# Patient Record
Sex: MALE | Race: WHITE | NOT HISPANIC OR LATINO | URBAN - METROPOLITAN AREA
[De-identification: names, ages, dates, MRNs, and addresses within clinical notes are randomized per-mention and may not be internally consistent; named-entity substitution may affect disease eponyms.]

---

## 2018-02-12 ENCOUNTER — INPATIENT (INPATIENT)
Facility: HOSPITAL | Age: 68
LOS: 3 days | Discharge: NURSING, REHAB OR LONG-TERM CARE | DRG: 470 | End: 2018-02-16
Attending: ORTHOPAEDIC SURGERY | Admitting: ORTHOPAEDIC SURGERY
Payer: MEDICARE

## 2018-02-12 VITALS
DIASTOLIC BLOOD PRESSURE: 68 MMHG | TEMPERATURE: 98 F | OXYGEN SATURATION: 98 % | HEART RATE: 60 BPM | RESPIRATION RATE: 20 BRPM | SYSTOLIC BLOOD PRESSURE: 134 MMHG

## 2018-02-12 LAB
ALBUMIN SERPL ELPH-MCNC: 4.3 G/DL — SIGNIFICANT CHANGE UP (ref 3.3–5)
ALP SERPL-CCNC: 75 U/L — SIGNIFICANT CHANGE UP (ref 40–120)
ALT FLD-CCNC: 16 U/L RC — SIGNIFICANT CHANGE UP (ref 10–45)
ANION GAP SERPL CALC-SCNC: 10 MMOL/L — SIGNIFICANT CHANGE UP (ref 5–17)
AST SERPL-CCNC: 24 U/L — SIGNIFICANT CHANGE UP (ref 10–40)
BASOPHILS # BLD AUTO: 0 K/UL — SIGNIFICANT CHANGE UP (ref 0–0.2)
BASOPHILS NFR BLD AUTO: 0.1 % — SIGNIFICANT CHANGE UP (ref 0–2)
BILIRUB SERPL-MCNC: 0.3 MG/DL — SIGNIFICANT CHANGE UP (ref 0.2–1.2)
BUN SERPL-MCNC: 13 MG/DL — SIGNIFICANT CHANGE UP (ref 7–23)
CALCIUM SERPL-MCNC: 9.2 MG/DL — SIGNIFICANT CHANGE UP (ref 8.4–10.5)
CHLORIDE SERPL-SCNC: 97 MMOL/L — SIGNIFICANT CHANGE UP (ref 96–108)
CK MB CFR SERPL CALC: 2.1 NG/ML — SIGNIFICANT CHANGE UP (ref 0–6.7)
CK SERPL-CCNC: 93 U/L — SIGNIFICANT CHANGE UP (ref 30–200)
CO2 SERPL-SCNC: 30 MMOL/L — SIGNIFICANT CHANGE UP (ref 22–31)
CREAT SERPL-MCNC: 0.84 MG/DL — SIGNIFICANT CHANGE UP (ref 0.5–1.3)
EOSINOPHIL # BLD AUTO: 0.1 K/UL — SIGNIFICANT CHANGE UP (ref 0–0.5)
EOSINOPHIL NFR BLD AUTO: 0.7 % — SIGNIFICANT CHANGE UP (ref 0–6)
GLUCOSE SERPL-MCNC: 143 MG/DL — HIGH (ref 70–99)
HCT VFR BLD CALC: 38.5 % — LOW (ref 39–50)
HGB BLD-MCNC: 13.4 G/DL — SIGNIFICANT CHANGE UP (ref 13–17)
LYMPHOCYTES # BLD AUTO: 1.3 K/UL — SIGNIFICANT CHANGE UP (ref 1–3.3)
LYMPHOCYTES # BLD AUTO: 14 % — SIGNIFICANT CHANGE UP (ref 13–44)
MAGNESIUM SERPL-MCNC: 1.9 MG/DL — SIGNIFICANT CHANGE UP (ref 1.6–2.6)
MCHC RBC-ENTMCNC: 31.8 PG — SIGNIFICANT CHANGE UP (ref 27–34)
MCHC RBC-ENTMCNC: 34.7 GM/DL — SIGNIFICANT CHANGE UP (ref 32–36)
MCV RBC AUTO: 91.7 FL — SIGNIFICANT CHANGE UP (ref 80–100)
MONOCYTES # BLD AUTO: 0.4 K/UL — SIGNIFICANT CHANGE UP (ref 0–0.9)
MONOCYTES NFR BLD AUTO: 4.5 % — SIGNIFICANT CHANGE UP (ref 2–14)
NEUTROPHILS # BLD AUTO: 7.3 K/UL — SIGNIFICANT CHANGE UP (ref 1.8–7.4)
NEUTROPHILS NFR BLD AUTO: 80.7 % — HIGH (ref 43–77)
PLATELET # BLD AUTO: 159 K/UL — SIGNIFICANT CHANGE UP (ref 150–400)
POTASSIUM SERPL-MCNC: 3.6 MMOL/L — SIGNIFICANT CHANGE UP (ref 3.5–5.3)
POTASSIUM SERPL-SCNC: 3.6 MMOL/L — SIGNIFICANT CHANGE UP (ref 3.5–5.3)
PROT SERPL-MCNC: 7.1 G/DL — SIGNIFICANT CHANGE UP (ref 6–8.3)
RBC # BLD: 4.2 M/UL — SIGNIFICANT CHANGE UP (ref 4.2–5.8)
RBC # FLD: 12 % — SIGNIFICANT CHANGE UP (ref 10.3–14.5)
SODIUM SERPL-SCNC: 137 MMOL/L — SIGNIFICANT CHANGE UP (ref 135–145)
TROPONIN T SERPL-MCNC: <0.01 NG/ML — SIGNIFICANT CHANGE UP (ref 0–0.06)
WBC # BLD: 9 K/UL — SIGNIFICANT CHANGE UP (ref 3.8–10.5)
WBC # FLD AUTO: 9 K/UL — SIGNIFICANT CHANGE UP (ref 3.8–10.5)

## 2018-02-12 PROCEDURE — 76377 3D RENDER W/INTRP POSTPROCES: CPT | Mod: 26

## 2018-02-12 PROCEDURE — 93010 ELECTROCARDIOGRAM REPORT: CPT

## 2018-02-12 PROCEDURE — 72192 CT PELVIS W/O DYE: CPT | Mod: 26

## 2018-02-12 PROCEDURE — 99285 EMERGENCY DEPT VISIT HI MDM: CPT | Mod: 25,GC

## 2018-02-12 RX ORDER — DIAZEPAM 5 MG
5 TABLET ORAL ONCE
Qty: 0 | Refills: 0 | Status: DISCONTINUED | OUTPATIENT
Start: 2018-02-12 | End: 2018-02-12

## 2018-02-12 RX ORDER — MORPHINE SULFATE 50 MG/1
2 CAPSULE, EXTENDED RELEASE ORAL ONCE
Qty: 0 | Refills: 0 | Status: DISCONTINUED | OUTPATIENT
Start: 2018-02-12 | End: 2018-02-12

## 2018-02-12 RX ADMIN — MORPHINE SULFATE 2 MILLIGRAM(S): 50 CAPSULE, EXTENDED RELEASE ORAL at 23:05

## 2018-02-12 RX ADMIN — MORPHINE SULFATE 2 MILLIGRAM(S): 50 CAPSULE, EXTENDED RELEASE ORAL at 22:42

## 2018-02-12 RX ADMIN — Medication 5 MILLIGRAM(S): at 22:44

## 2018-02-12 NOTE — ED ADULT NURSE NOTE - PMH
CVA (cerebral vascular accident)    Weakness due to acute cerebrovascular accident (CVA)  right side

## 2018-02-12 NOTE — ED PROVIDER NOTE - ATTENDING CONTRIBUTION TO CARE
attending Anderson: 67yM former smoker prior CVA with residual RUE paresis, uses quad cane, HTN, who presents s/p fall with right leg pain. Reports episode of near syncope with fall but no LOC after feeling lightheaded. Denies chest pain, palpitation visual changes. Pt tried to sit at the edge of the bed but missed and fell to the ground on his buttocks. Now with sharp R groin pain radiating down the right leg, not able to fully extend. Concern for R hip fx. Unable to ambulate or bear weight. Will obtain ekg, place on tele, labs including cardiac enzymes, xray pelvis/hip and CT pelvis, pain control, likely ortho consult and admit.

## 2018-02-12 NOTE — ED ADULT NURSE NOTE - NURSING MUSC EXTREMITY NORMAL ROM
right upper extremity/left upper extremity/left lower extremity left upper extremity/left lower extremity

## 2018-02-12 NOTE — ED PROVIDER NOTE - OBJECTIVE STATEMENT
The patient is a 67 Y.O. male with pmh of CVA, right upper paresis, uses quad cane, HTN, who presents s/p fall with new right leg pain.     The patient came home from a walk, felt light headed, without LOC, without chest pain, palpitation visual changes tried to sit at the edge of the bed but missed and fell to the ground on his buttocks. After the fall the patient started to have pain, sharp radiating down the right leg, not able to fully extend. Has history as spasm in the past on baclofen at home. Unable to fully extend leg and severe pain with palpation of the hamstring and attempted extension of the leg. Son at bedside translating. [phone].

## 2018-02-12 NOTE — ED PROVIDER NOTE - LOWER EXTREMITY EXAM, RIGHT
decreased ROM, severe pain with attempted extension of the leg, tenderness to palpation on squeezing the hamstring, muscle tightness at the site. Ant drawer, lacmans, valgus, varus test negative, unable to jr 2/2 to pain tightness. No knee effusion/tenderness

## 2018-02-12 NOTE — ED PROVIDER NOTE - SHIFT CHANGE DETAILS
Attending MD Adkins: 67M with near syncope presents to the ED with R sided pain s/p fall, found to have R femoral neck fracture displace.  Pending admission ortho vs medicine.

## 2018-02-12 NOTE — ED ADULT NURSE NOTE - NURSING MUSC EXTREMITY LIMITED ROM
decreased ROM secondary to pain/right lower extremity right lower extremity/decreased ROM secondary to pain-unable to fully extend rt leg. rt arm partially contracted since stroke

## 2018-02-12 NOTE — ED ADULT NURSE NOTE - OBJECTIVE STATEMENT
pt c/o rt leg pain s/p fall onto buttocks when trying to sit in chair. did no hit head, no loc." pt with decreased ROM to RLE secondary to pain pt c/o rt leg pain s/p fall onto buttocks when trying to sit in chair. did no hit head, no loc." pt with decreased ROM to RLE secondary to pain. pt unable to fully extend rt leg

## 2018-02-12 NOTE — ED PROVIDER NOTE - CARE PLAN
Principal Discharge DX:	Near syncope  Secondary Diagnosis:	Leg pain, diffuse, right  Secondary Diagnosis:	Fall

## 2018-02-12 NOTE — ED PROVIDER NOTE - NEURO NEGATIVE STATEMENT, MLM
no loss of consciousness, no gait abnormality, no headache, no sensory deficits, and no weakness. + light headedness

## 2018-02-12 NOTE — ED PROVIDER NOTE - MEDICAL DECISION MAKING DETAILS
The patient is a 67 Y.O. male with pmh of CVA, right upper paresis, uses quad cane, HTN, who presents s/p fall with new right leg pain.  Pre-syncopal episode, likely spasms, r/o fracture, CT pelvic, xray of knee and pelvis,   Labs, EKG, CE x1, tele  Likely admit as patient not ambulating.

## 2018-02-12 NOTE — ED ADULT NURSE NOTE - CHPI ED SYMPTOMS NEG
no abrasion/no bleeding/no vomiting/no numbness/no loss of consciousness/no tingling/no weakness/no confusion/no deformity/no fever

## 2018-02-13 ENCOUNTER — TRANSCRIPTION ENCOUNTER (OUTPATIENT)
Age: 68
End: 2018-02-13

## 2018-02-13 DIAGNOSIS — R55 SYNCOPE AND COLLAPSE: ICD-10-CM

## 2018-02-13 LAB
ANION GAP SERPL CALC-SCNC: 11 MMOL/L — SIGNIFICANT CHANGE UP (ref 5–17)
APPEARANCE UR: CLEAR — SIGNIFICANT CHANGE UP
APTT BLD: 25.4 SEC — LOW (ref 27.5–37.4)
APTT BLD: 27.1 SEC — LOW (ref 27.5–37.4)
BILIRUB UR-MCNC: NEGATIVE — SIGNIFICANT CHANGE UP
BLD GP AB SCN SERPL QL: NEGATIVE — SIGNIFICANT CHANGE UP
BUN SERPL-MCNC: 11 MG/DL — SIGNIFICANT CHANGE UP (ref 7–23)
CALCIUM SERPL-MCNC: 8.9 MG/DL — SIGNIFICANT CHANGE UP (ref 8.4–10.5)
CHLORIDE SERPL-SCNC: 98 MMOL/L — SIGNIFICANT CHANGE UP (ref 96–108)
CO2 SERPL-SCNC: 27 MMOL/L — SIGNIFICANT CHANGE UP (ref 22–31)
COLOR SPEC: YELLOW — SIGNIFICANT CHANGE UP
CREAT SERPL-MCNC: 0.81 MG/DL — SIGNIFICANT CHANGE UP (ref 0.5–1.3)
DIFF PNL FLD: ABNORMAL
GLUCOSE SERPL-MCNC: 140 MG/DL — HIGH (ref 70–99)
GLUCOSE UR QL: NEGATIVE — SIGNIFICANT CHANGE UP
HCT VFR BLD CALC: 37 % — LOW (ref 39–50)
HGB BLD-MCNC: 13.2 G/DL — SIGNIFICANT CHANGE UP (ref 13–17)
INR BLD: 1.05 RATIO — SIGNIFICANT CHANGE UP (ref 0.88–1.16)
INR BLD: 1.14 RATIO — SIGNIFICANT CHANGE UP (ref 0.88–1.16)
KETONES UR-MCNC: NEGATIVE — SIGNIFICANT CHANGE UP
LEUKOCYTE ESTERASE UR-ACNC: NEGATIVE — SIGNIFICANT CHANGE UP
MCHC RBC-ENTMCNC: 32.5 PG — SIGNIFICANT CHANGE UP (ref 27–34)
MCHC RBC-ENTMCNC: 35.6 GM/DL — SIGNIFICANT CHANGE UP (ref 32–36)
MCV RBC AUTO: 91.3 FL — SIGNIFICANT CHANGE UP (ref 80–100)
NITRITE UR-MCNC: NEGATIVE — SIGNIFICANT CHANGE UP
PH UR: 7 — SIGNIFICANT CHANGE UP (ref 5–8)
PLATELET # BLD AUTO: 151 K/UL — SIGNIFICANT CHANGE UP (ref 150–400)
POTASSIUM SERPL-MCNC: 4.1 MMOL/L — SIGNIFICANT CHANGE UP (ref 3.5–5.3)
POTASSIUM SERPL-SCNC: 4.1 MMOL/L — SIGNIFICANT CHANGE UP (ref 3.5–5.3)
PROT UR-MCNC: NEGATIVE — SIGNIFICANT CHANGE UP
PROTHROM AB SERPL-ACNC: 11.5 SEC — SIGNIFICANT CHANGE UP (ref 9.8–12.7)
PROTHROM AB SERPL-ACNC: 12.5 SEC — SIGNIFICANT CHANGE UP (ref 9.8–12.7)
RBC # BLD: 4.06 M/UL — LOW (ref 4.2–5.8)
RBC # FLD: 11.9 % — SIGNIFICANT CHANGE UP (ref 10.3–14.5)
RH IG SCN BLD-IMP: POSITIVE — SIGNIFICANT CHANGE UP
RH IG SCN BLD-IMP: POSITIVE — SIGNIFICANT CHANGE UP
SODIUM SERPL-SCNC: 136 MMOL/L — SIGNIFICANT CHANGE UP (ref 135–145)
SP GR SPEC: 1.01 — SIGNIFICANT CHANGE UP (ref 1.01–1.02)
UROBILINOGEN FLD QL: NEGATIVE — SIGNIFICANT CHANGE UP
WBC # BLD: 10.6 K/UL — HIGH (ref 3.8–10.5)
WBC # FLD AUTO: 10.6 K/UL — HIGH (ref 3.8–10.5)

## 2018-02-13 PROCEDURE — 73502 X-RAY EXAM HIP UNI 2-3 VIEWS: CPT | Mod: 26,RT

## 2018-02-13 PROCEDURE — 73552 X-RAY EXAM OF FEMUR 2/>: CPT | Mod: 26,RT

## 2018-02-13 PROCEDURE — 71045 X-RAY EXAM CHEST 1 VIEW: CPT | Mod: 26

## 2018-02-13 PROCEDURE — 72170 X-RAY EXAM OF PELVIS: CPT | Mod: 26,59

## 2018-02-13 RX ORDER — CITALOPRAM 10 MG/1
20 TABLET, FILM COATED ORAL DAILY
Qty: 0 | Refills: 0 | Status: DISCONTINUED | OUTPATIENT
Start: 2018-02-13 | End: 2018-02-14

## 2018-02-13 RX ORDER — ACETAMINOPHEN 500 MG
650 TABLET ORAL EVERY 6 HOURS
Qty: 0 | Refills: 0 | Status: DISCONTINUED | OUTPATIENT
Start: 2018-02-13 | End: 2018-02-14

## 2018-02-13 RX ORDER — OXYCODONE HYDROCHLORIDE 5 MG/1
5 TABLET ORAL EVERY 4 HOURS
Qty: 0 | Refills: 0 | Status: DISCONTINUED | OUTPATIENT
Start: 2018-02-13 | End: 2018-02-14

## 2018-02-13 RX ORDER — TRAZODONE HCL 50 MG
50 TABLET ORAL AT BEDTIME
Qty: 0 | Refills: 0 | Status: DISCONTINUED | OUTPATIENT
Start: 2018-02-13 | End: 2018-02-14

## 2018-02-13 RX ORDER — ACETAMINOPHEN 500 MG
650 TABLET ORAL EVERY 6 HOURS
Qty: 0 | Refills: 0 | Status: DISCONTINUED | OUTPATIENT
Start: 2018-02-14 | End: 2018-02-16

## 2018-02-13 RX ORDER — POLYETHYLENE GLYCOL 3350 17 G/17G
17 POWDER, FOR SOLUTION ORAL DAILY
Qty: 0 | Refills: 0 | Status: DISCONTINUED | OUTPATIENT
Start: 2018-02-14 | End: 2018-02-16

## 2018-02-13 RX ORDER — MAGNESIUM HYDROXIDE 400 MG/1
30 TABLET, CHEWABLE ORAL DAILY
Qty: 0 | Refills: 0 | Status: DISCONTINUED | OUTPATIENT
Start: 2018-02-13 | End: 2018-02-14

## 2018-02-13 RX ORDER — ONDANSETRON 8 MG/1
4 TABLET, FILM COATED ORAL EVERY 6 HOURS
Qty: 0 | Refills: 0 | Status: DISCONTINUED | OUTPATIENT
Start: 2018-02-14 | End: 2018-02-16

## 2018-02-13 RX ORDER — CEFAZOLIN SODIUM 1 G
2000 VIAL (EA) INJECTION EVERY 8 HOURS
Qty: 0 | Refills: 0 | Status: COMPLETED | OUTPATIENT
Start: 2018-02-14 | End: 2018-02-14

## 2018-02-13 RX ORDER — PANTOPRAZOLE SODIUM 20 MG/1
40 TABLET, DELAYED RELEASE ORAL DAILY
Qty: 0 | Refills: 0 | Status: DISCONTINUED | OUTPATIENT
Start: 2018-02-14 | End: 2018-02-16

## 2018-02-13 RX ORDER — ASPIRIN/CALCIUM CARB/MAGNESIUM 324 MG
325 TABLET ORAL
Qty: 0 | Refills: 0 | Status: DISCONTINUED | OUTPATIENT
Start: 2018-02-14 | End: 2018-02-16

## 2018-02-13 RX ORDER — OXYCODONE HYDROCHLORIDE 5 MG/1
5 TABLET ORAL EVERY 4 HOURS
Qty: 0 | Refills: 0 | Status: DISCONTINUED | OUTPATIENT
Start: 2018-02-14 | End: 2018-02-16

## 2018-02-13 RX ORDER — SODIUM CHLORIDE 9 MG/ML
1000 INJECTION, SOLUTION INTRAVENOUS
Qty: 0 | Refills: 0 | Status: DISCONTINUED | OUTPATIENT
Start: 2018-02-13 | End: 2018-02-14

## 2018-02-13 RX ORDER — ASCORBIC ACID 60 MG
500 TABLET,CHEWABLE ORAL
Qty: 0 | Refills: 0 | Status: DISCONTINUED | OUTPATIENT
Start: 2018-02-13 | End: 2018-02-14

## 2018-02-13 RX ORDER — ONDANSETRON 8 MG/1
4 TABLET, FILM COATED ORAL EVERY 4 HOURS
Qty: 0 | Refills: 0 | Status: DISCONTINUED | OUTPATIENT
Start: 2018-02-13 | End: 2018-02-14

## 2018-02-13 RX ORDER — FERROUS SULFATE 325(65) MG
325 TABLET ORAL
Qty: 0 | Refills: 0 | Status: DISCONTINUED | OUTPATIENT
Start: 2018-02-13 | End: 2018-02-14

## 2018-02-13 RX ORDER — MAGNESIUM HYDROXIDE 400 MG/1
30 TABLET, CHEWABLE ORAL DAILY
Qty: 0 | Refills: 0 | Status: DISCONTINUED | OUTPATIENT
Start: 2018-02-14 | End: 2018-02-16

## 2018-02-13 RX ORDER — ASPIRIN/CALCIUM CARB/MAGNESIUM 324 MG
81 TABLET ORAL DAILY
Qty: 0 | Refills: 0 | Status: DISCONTINUED | OUTPATIENT
Start: 2018-02-13 | End: 2018-02-14

## 2018-02-13 RX ORDER — FOLIC ACID 0.8 MG
1 TABLET ORAL DAILY
Qty: 0 | Refills: 0 | Status: DISCONTINUED | OUTPATIENT
Start: 2018-02-13 | End: 2018-02-14

## 2018-02-13 RX ORDER — DOCUSATE SODIUM 100 MG
100 CAPSULE ORAL THREE TIMES A DAY
Qty: 0 | Refills: 0 | Status: DISCONTINUED | OUTPATIENT
Start: 2018-02-13 | End: 2018-02-14

## 2018-02-13 RX ORDER — SODIUM CHLORIDE 9 MG/ML
1000 INJECTION INTRAMUSCULAR; INTRAVENOUS; SUBCUTANEOUS ONCE
Qty: 0 | Refills: 0 | Status: COMPLETED | OUTPATIENT
Start: 2018-02-14 | End: 2018-02-14

## 2018-02-13 RX ORDER — DOCUSATE SODIUM 100 MG
100 CAPSULE ORAL THREE TIMES A DAY
Qty: 0 | Refills: 0 | Status: DISCONTINUED | OUTPATIENT
Start: 2018-02-14 | End: 2018-02-16

## 2018-02-13 RX ORDER — OXYCODONE HYDROCHLORIDE 5 MG/1
10 TABLET ORAL EVERY 4 HOURS
Qty: 0 | Refills: 0 | Status: DISCONTINUED | OUTPATIENT
Start: 2018-02-14 | End: 2018-02-16

## 2018-02-13 RX ORDER — MORPHINE SULFATE 50 MG/1
2 CAPSULE, EXTENDED RELEASE ORAL
Qty: 0 | Refills: 0 | Status: DISCONTINUED | OUTPATIENT
Start: 2018-02-14 | End: 2018-02-16

## 2018-02-13 RX ORDER — SENNA PLUS 8.6 MG/1
2 TABLET ORAL AT BEDTIME
Qty: 0 | Refills: 0 | Status: DISCONTINUED | OUTPATIENT
Start: 2018-02-14 | End: 2018-02-16

## 2018-02-13 RX ORDER — SODIUM CHLORIDE 9 MG/ML
1000 INJECTION, SOLUTION INTRAVENOUS
Qty: 0 | Refills: 0 | Status: DISCONTINUED | OUTPATIENT
Start: 2018-02-14 | End: 2018-02-16

## 2018-02-13 RX ORDER — DIPHENHYDRAMINE HCL 50 MG
25 CAPSULE ORAL AT BEDTIME
Qty: 0 | Refills: 0 | Status: DISCONTINUED | OUTPATIENT
Start: 2018-02-13 | End: 2018-02-14

## 2018-02-13 RX ORDER — POLYETHYLENE GLYCOL 3350 17 G/17G
17 POWDER, FOR SOLUTION ORAL DAILY
Qty: 0 | Refills: 0 | Status: DISCONTINUED | OUTPATIENT
Start: 2018-02-13 | End: 2018-02-14

## 2018-02-13 RX ORDER — HYDROMORPHONE HYDROCHLORIDE 2 MG/ML
0.5 INJECTION INTRAMUSCULAR; INTRAVENOUS; SUBCUTANEOUS EVERY 4 HOURS
Qty: 0 | Refills: 0 | Status: DISCONTINUED | OUTPATIENT
Start: 2018-02-13 | End: 2018-02-14

## 2018-02-13 RX ORDER — OXYCODONE HYDROCHLORIDE 5 MG/1
10 TABLET ORAL EVERY 4 HOURS
Qty: 0 | Refills: 0 | Status: DISCONTINUED | OUTPATIENT
Start: 2018-02-13 | End: 2018-02-14

## 2018-02-13 RX ORDER — GABAPENTIN 400 MG/1
300 CAPSULE ORAL THREE TIMES A DAY
Qty: 0 | Refills: 0 | Status: DISCONTINUED | OUTPATIENT
Start: 2018-02-13 | End: 2018-02-14

## 2018-02-13 RX ORDER — ATORVASTATIN CALCIUM 80 MG/1
10 TABLET, FILM COATED ORAL AT BEDTIME
Qty: 0 | Refills: 0 | Status: DISCONTINUED | OUTPATIENT
Start: 2018-02-13 | End: 2018-02-14

## 2018-02-13 RX ADMIN — OXYCODONE HYDROCHLORIDE 10 MILLIGRAM(S): 5 TABLET ORAL at 13:48

## 2018-02-13 RX ADMIN — Medication 100 MILLIGRAM(S): at 05:38

## 2018-02-13 RX ADMIN — Medication 325 MILLIGRAM(S): at 18:20

## 2018-02-13 RX ADMIN — SODIUM CHLORIDE 75 MILLILITER(S): 9 INJECTION, SOLUTION INTRAVENOUS at 11:21

## 2018-02-13 RX ADMIN — HYDROMORPHONE HYDROCHLORIDE 0.5 MILLIGRAM(S): 2 INJECTION INTRAMUSCULAR; INTRAVENOUS; SUBCUTANEOUS at 10:59

## 2018-02-13 RX ADMIN — OXYCODONE HYDROCHLORIDE 10 MILLIGRAM(S): 5 TABLET ORAL at 14:30

## 2018-02-13 RX ADMIN — Medication 1 TABLET(S): at 11:20

## 2018-02-13 RX ADMIN — HYDROMORPHONE HYDROCHLORIDE 0.5 MILLIGRAM(S): 2 INJECTION INTRAMUSCULAR; INTRAVENOUS; SUBCUTANEOUS at 03:40

## 2018-02-13 RX ADMIN — HYDROMORPHONE HYDROCHLORIDE 0.5 MILLIGRAM(S): 2 INJECTION INTRAMUSCULAR; INTRAVENOUS; SUBCUTANEOUS at 07:29

## 2018-02-13 RX ADMIN — Medication 1 MILLIGRAM(S): at 11:20

## 2018-02-13 RX ADMIN — HYDROMORPHONE HYDROCHLORIDE 0.5 MILLIGRAM(S): 2 INJECTION INTRAMUSCULAR; INTRAVENOUS; SUBCUTANEOUS at 09:10

## 2018-02-13 RX ADMIN — Medication 500 MILLIGRAM(S): at 18:20

## 2018-02-13 RX ADMIN — GABAPENTIN 300 MILLIGRAM(S): 400 CAPSULE ORAL at 22:30

## 2018-02-13 RX ADMIN — Medication 100 MILLIGRAM(S): at 22:30

## 2018-02-13 RX ADMIN — Medication 100 MILLIGRAM(S): at 13:48

## 2018-02-13 RX ADMIN — GABAPENTIN 300 MILLIGRAM(S): 400 CAPSULE ORAL at 13:48

## 2018-02-13 RX ADMIN — Medication 500 MILLIGRAM(S): at 05:38

## 2018-02-13 RX ADMIN — Medication 25 MILLIGRAM(S): at 03:43

## 2018-02-13 RX ADMIN — Medication 81 MILLIGRAM(S): at 11:20

## 2018-02-13 RX ADMIN — CITALOPRAM 20 MILLIGRAM(S): 10 TABLET, FILM COATED ORAL at 11:20

## 2018-02-13 RX ADMIN — HYDROMORPHONE HYDROCHLORIDE 0.5 MILLIGRAM(S): 2 INJECTION INTRAMUSCULAR; INTRAVENOUS; SUBCUTANEOUS at 18:19

## 2018-02-13 RX ADMIN — ATORVASTATIN CALCIUM 10 MILLIGRAM(S): 80 TABLET, FILM COATED ORAL at 22:30

## 2018-02-13 RX ADMIN — HYDROMORPHONE HYDROCHLORIDE 0.5 MILLIGRAM(S): 2 INJECTION INTRAMUSCULAR; INTRAVENOUS; SUBCUTANEOUS at 19:29

## 2018-02-13 RX ADMIN — Medication 325 MILLIGRAM(S): at 11:22

## 2018-02-13 NOTE — CONSULT NOTE ADULT - SUBJECTIVE AND OBJECTIVE BOX
Patient was seen and examined today. S/P fall with acute right subcapital femoral neck fracture requiring ORIF. 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 The patient was independently ambulatory until yesterday with fall and fracture. The patient is medically stable, medically optimized and has no medical contraindication to surgery today as required. Exam time 70 minutes including > than 50 % for bedside discussion and counseling. Comprehensive consultation performed and dictation # [id number].
CONSULTING SERVICE:  Medicine.    REASON FOR CONSULTATION:  Acute right hip fracture.      HISTORY OF PRESENT ILLNESS:  The patient is a 67-year-old male who was in his usual state of health and had an accidental fall while walking at home.  He has been compromised for the past 7 years having had an acute left CVA with right hemiparesis.  As a result of a stroke, he has no movement of his right arm and he has limited movement of his right leg.  He is able to walk with a quad cane and ambulate freely and independently.  He had an accidental fall because of right leg weakness.  Last night, he was unable to rise.  The wife was present, called an ambulance and he was brought to the emergency room where x-rays were performed and a right subcapital femoral neck fracture with no displacement was identified.  The patient is scheduled for surgical stabilization ORIF of the right hip for later today.  History was obtained from both the patient and his son who is a physician.    MEDICATIONS:  At this time include 300 mg of gabapentin three times a day, 20 mg of Celexa in the morning, 10 mg of enalapril twice a day, 10 mg of baclofen once a day, 40 mg of pravastatin daily, 81 mg of aspirin daily, trazodone 50 mg at bedtime and MiraLax once daily.    ALLERGIES:  THE PATIENT HAS NO KNOWN ALLERGIES.    SOCIAL HISTORY:  He has a history of smoking for 40 years, two packs a day or 80-pack a year.  He stopped smoking 7 years ago when he had his acute CVA.  He drinks small amount socially.  He has no drug history.    PAST SURGICAL HISTORY:  None.    PAST MEDICAL HISTORY:  Includes a cryptogenic CVA 7 years ago with residual right hemiparesis and normal cognitive function.  He has no cardiac history.  He has controlled hypertension.  He has right leg spasms, hypercholesterolemia and chronic depression since his stroke.  His diet is regular.  His weight is stable at approximately 160 pounds.  The patient is a retired restaurateur.    FAMILY HISTORY:  Positive only for his brother who  of probable lung carcinoma.      REVIEW OF SYSTEMS:  He has no difficulty with headaches or dizziness.  He has no changes in hearing or vision.  He has no sinusitis or dental disease.  He has no difficulty swallowing.  His breathing has been good.  He has no shortness of breath, cough, congestion or wheezing.  He has no cardiac symptoms of chest pain, palpitations, arrhythmia or syncopal events.  He has no abdominal pain except chronic constipation.  He has no GI bleeding or anemia.  He has no dysuria.  He does have urinary frequency since his stroke.  He has no rashes or skin disease.  He has no diabetes or thyroid disease.  He has no significant osteoarthritis and was fully ambulatory prior to his fall.  Neurologically, he has 0/5 right strength by history and 3/5 right leg strength by history.    PHYSICAL EXAMINATION:  VITAL SIGNS:  At the time of the examination shows a blood pressure 130/70, pulse 60, respirations are 18.  He is afebrile.  HEAD AND NECK:  Unremarkable.  CHEST:  Clear with good breath sounds bilaterally.  CARDIAC:  Shows normal sinus rhythm with no gallops or murmurs.  ABDOMEN:  Soft with no masses, tenderness, guarding or rebound.  EXTREMITIES:  He has 4+ right proximal swelling of his hip secondary to hip fracture.  He has shortening of the leg, but no lateral rotation.    LABORATORY DATA:  CBC, hemoglobin 13.2, hematocrit 37.0, white count 10.6, and platelet count is 151,000.  INR is 1.14.  PTT is 27.1.  Sodium 136, potassium 4.1, chloride 98, CO2 is 27, blood sugars 140, BUN is 11, and creatinine 0.81.  Urinalysis is negative.    DIAGNOSTIC DATA:  X-ray shows a subcapital femoral neck impacted fracture.  EKG was normal.  Chest x-ray was normal.    IMPRESSION AND PLAN:  The impression at this time is this patient is in good health with no significant acute illnesses.  He has an old left cardiovascular accident with right hemiparesis, right arm greater than right leg, but was fully ambulatory prior to accidental fall.  He is medically stable and has no medical contraindications to surgery as required for later today.  Comprehensive dictation performed required 70 minutes of which greater than 50% was necessary for bedside discussion and counseling with both the patient and his son.  He will continue medical management postoperatively to lessen the risk of postoperative complications.      _______________________    DICT:	BRET TAYLOR MD  ([id number]) 2018 10:47 AM  TRANS:	[id number] 2018 10:56 AM  JOB:	[id number]    Electronically Signed by:  BRET TAYLOR 2018 10:16:55 AM

## 2018-02-13 NOTE — H&P ADULT - HISTORY OF PRESENT ILLNESS
67y Male community ambulatory with cane presents c/o R hip pain and inability to ambulate sp questionable syncopal fall today. Pt felt slightly dizzy after sitting up and slipped off bed onto hip. Possible HS but denies LOC. Denies numbness/tingling. Denies fever/chills. Denies pain/injury elsewhere. pt baseline has Right UE weakness and RLE weakness 2/2 stroke 5 years ago. As per pt he is able to ambulate daily multiple miles and uses a cane.    Allergies    No Known Allergies    Intolerances                          13.4   9.0   )-----------( 159      ( 12 Feb 2018 23:11 )             38.5     12 Feb 2018 23:11    137    |  97     |  13     ----------------------------<  143    3.6     |  30     |  0.84     Ca    9.2        12 Feb 2018 23:11  Mg     1.9       12 Feb 2018 23:11    TPro  7.1    /  Alb  4.3    /  TBili  0.3    /  DBili  x      /  AST  24     /  ALT  16     /  AlkPhos  75     12 Feb 2018 23:11    PT/INR - ( 12 Feb 2018 23:11 )   PT: 11.5 sec;   INR: 1.05 ratio         PTT - ( 12 Feb 2018 23:11 )  PTT:25.4 sec  Vital Signs Last 24 Hrs  T(C): 36.6 (02-13-18 @ 02:29), Max: 36.6 (02-13-18 @ 01:10)  T(F): 97.8 (02-13-18 @ 02:29), Max: 97.9 (02-13-18 @ 01:10)  HR: 57 (02-13-18 @ 02:29) (57 - 67)  BP: 128/77 (02-13-18 @ 02:29) (128/77 - 142/77)  BP(mean): --  RR: 18 (02-13-18 @ 02:29) (18 - 20)  SpO2: 100% (02-13-18 @ 02:29) (97% - 100%)    Imaging: XR demonstrates R hip fracture

## 2018-02-13 NOTE — H&P ADULT - NSHPPHYSICALEXAM_GEN_ALL_CORE
Physical Exam  Gen: NAD  R LE: skin intact, unable to SLR R LE 2/2 pain, + log roll R LE, +ttp hip/groin, no ttp elsewhere, +ehl/fhl/ta/gs function, silt l3-s1, no calf ttp, dp/pt pulse intact, compartments soft    Secondary survey: benign, nv intact, able to SLR contralateral leg, negative log roll contralateral leg, no bony ttp elsewhere,. global weakness RUE but able use use arm

## 2018-02-13 NOTE — H&P ADULT - ASSESSMENT
A/P: 67y Male with R hip fracture    Pain control  NWB R LE, bedrest  FU labs/imaging  imaging reviewed  Admit to medical team  Medical clearance/optimization for OR 2/13  npo except meds  iv fluids while npo  hold chemical AC  will discuss with attending

## 2018-02-13 NOTE — ED ADULT NURSE REASSESSMENT NOTE - STATUS
ortho consult present with pt at bedside- Dr Carolina ortho consult present with pt at bedside- Dr Adkins

## 2018-02-14 ENCOUNTER — RESULT REVIEW (OUTPATIENT)
Age: 68
End: 2018-02-14

## 2018-02-14 DIAGNOSIS — I63.9 CEREBRAL INFARCTION, UNSPECIFIED: ICD-10-CM

## 2018-02-14 DIAGNOSIS — I10 ESSENTIAL (PRIMARY) HYPERTENSION: ICD-10-CM

## 2018-02-14 DIAGNOSIS — J44.9 CHRONIC OBSTRUCTIVE PULMONARY DISEASE, UNSPECIFIED: ICD-10-CM

## 2018-02-14 DIAGNOSIS — S72.001A FRACTURE OF UNSPECIFIED PART OF NECK OF RIGHT FEMUR, INITIAL ENCOUNTER FOR CLOSED FRACTURE: ICD-10-CM

## 2018-02-14 LAB
ANION GAP SERPL CALC-SCNC: 13 MMOL/L — SIGNIFICANT CHANGE UP (ref 5–17)
BUN SERPL-MCNC: 8 MG/DL — SIGNIFICANT CHANGE UP (ref 7–23)
CALCIUM SERPL-MCNC: 7.9 MG/DL — LOW (ref 8.4–10.5)
CHLORIDE SERPL-SCNC: 98 MMOL/L — SIGNIFICANT CHANGE UP (ref 96–108)
CO2 SERPL-SCNC: 24 MMOL/L — SIGNIFICANT CHANGE UP (ref 22–31)
CREAT SERPL-MCNC: 0.78 MG/DL — SIGNIFICANT CHANGE UP (ref 0.5–1.3)
GLUCOSE SERPL-MCNC: 188 MG/DL — HIGH (ref 70–99)
HCT VFR BLD CALC: 36.1 % — LOW (ref 39–50)
HGB BLD-MCNC: 12.2 G/DL — LOW (ref 13–17)
MCHC RBC-ENTMCNC: 31.4 PG — SIGNIFICANT CHANGE UP (ref 27–34)
MCHC RBC-ENTMCNC: 33.9 GM/DL — SIGNIFICANT CHANGE UP (ref 32–36)
MCV RBC AUTO: 92.7 FL — SIGNIFICANT CHANGE UP (ref 80–100)
PLATELET # BLD AUTO: 143 K/UL — LOW (ref 150–400)
POTASSIUM SERPL-MCNC: 3.8 MMOL/L — SIGNIFICANT CHANGE UP (ref 3.5–5.3)
POTASSIUM SERPL-SCNC: 3.8 MMOL/L — SIGNIFICANT CHANGE UP (ref 3.5–5.3)
RBC # BLD: 3.9 M/UL — LOW (ref 4.2–5.8)
RBC # FLD: 12.1 % — SIGNIFICANT CHANGE UP (ref 10.3–14.5)
SODIUM SERPL-SCNC: 135 MMOL/L — SIGNIFICANT CHANGE UP (ref 135–145)
WBC # BLD: 10.9 K/UL — HIGH (ref 3.8–10.5)
WBC # FLD AUTO: 10.9 K/UL — HIGH (ref 3.8–10.5)

## 2018-02-14 PROCEDURE — 73502 X-RAY EXAM HIP UNI 2-3 VIEWS: CPT | Mod: 26,76,RT

## 2018-02-14 PROCEDURE — 88311 DECALCIFY TISSUE: CPT | Mod: 26

## 2018-02-14 PROCEDURE — 88305 TISSUE EXAM BY PATHOLOGIST: CPT | Mod: 26

## 2018-02-14 RX ORDER — CITALOPRAM 10 MG/1
20 TABLET, FILM COATED ORAL DAILY
Qty: 0 | Refills: 0 | Status: DISCONTINUED | OUTPATIENT
Start: 2018-02-14 | End: 2018-02-16

## 2018-02-14 RX ORDER — HYDROMORPHONE HYDROCHLORIDE 2 MG/ML
0.5 INJECTION INTRAMUSCULAR; INTRAVENOUS; SUBCUTANEOUS
Qty: 0 | Refills: 0 | Status: DISCONTINUED | OUTPATIENT
Start: 2018-02-14 | End: 2018-02-14

## 2018-02-14 RX ORDER — BACLOFEN 100 %
10 POWDER (GRAM) MISCELLANEOUS DAILY
Qty: 0 | Refills: 0 | Status: DISCONTINUED | OUTPATIENT
Start: 2018-02-14 | End: 2018-02-16

## 2018-02-14 RX ORDER — ATORVASTATIN CALCIUM 80 MG/1
10 TABLET, FILM COATED ORAL AT BEDTIME
Qty: 0 | Refills: 0 | Status: DISCONTINUED | OUTPATIENT
Start: 2018-02-14 | End: 2018-02-16

## 2018-02-14 RX ORDER — HYDROMORPHONE HYDROCHLORIDE 2 MG/ML
0.25 INJECTION INTRAMUSCULAR; INTRAVENOUS; SUBCUTANEOUS
Qty: 0 | Refills: 0 | Status: DISCONTINUED | OUTPATIENT
Start: 2018-02-14 | End: 2018-02-14

## 2018-02-14 RX ORDER — TRAZODONE HCL 50 MG
50 TABLET ORAL AT BEDTIME
Qty: 0 | Refills: 0 | Status: DISCONTINUED | OUTPATIENT
Start: 2018-02-14 | End: 2018-02-16

## 2018-02-14 RX ORDER — GABAPENTIN 400 MG/1
300 CAPSULE ORAL THREE TIMES A DAY
Qty: 0 | Refills: 0 | Status: DISCONTINUED | OUTPATIENT
Start: 2018-02-14 | End: 2018-02-16

## 2018-02-14 RX ADMIN — GABAPENTIN 300 MILLIGRAM(S): 400 CAPSULE ORAL at 23:20

## 2018-02-14 RX ADMIN — Medication 10 MILLIGRAM(S): at 23:19

## 2018-02-14 RX ADMIN — PANTOPRAZOLE SODIUM 40 MILLIGRAM(S): 20 TABLET, DELAYED RELEASE ORAL at 11:46

## 2018-02-14 RX ADMIN — OXYCODONE HYDROCHLORIDE 5 MILLIGRAM(S): 5 TABLET ORAL at 18:40

## 2018-02-14 RX ADMIN — OXYCODONE HYDROCHLORIDE 5 MILLIGRAM(S): 5 TABLET ORAL at 19:24

## 2018-02-14 RX ADMIN — Medication 100 MILLIGRAM(S): at 22:01

## 2018-02-14 RX ADMIN — Medication 10 MILLIGRAM(S): at 18:40

## 2018-02-14 RX ADMIN — CITALOPRAM 20 MILLIGRAM(S): 10 TABLET, FILM COATED ORAL at 11:46

## 2018-02-14 RX ADMIN — ATORVASTATIN CALCIUM 10 MILLIGRAM(S): 80 TABLET, FILM COATED ORAL at 22:01

## 2018-02-14 RX ADMIN — Medication 100 MILLIGRAM(S): at 09:00

## 2018-02-14 RX ADMIN — Medication 100 MILLIGRAM(S): at 17:41

## 2018-02-14 RX ADMIN — OXYCODONE HYDROCHLORIDE 5 MILLIGRAM(S): 5 TABLET ORAL at 23:19

## 2018-02-14 RX ADMIN — SODIUM CHLORIDE 75 MILLILITER(S): 9 INJECTION, SOLUTION INTRAVENOUS at 06:19

## 2018-02-14 RX ADMIN — Medication 325 MILLIGRAM(S): at 18:40

## 2018-02-14 RX ADMIN — SODIUM CHLORIDE 75 MILLILITER(S): 9 INJECTION, SOLUTION INTRAVENOUS at 09:00

## 2018-02-14 RX ADMIN — Medication 50 MILLIGRAM(S): at 22:01

## 2018-02-14 RX ADMIN — SODIUM CHLORIDE 2000 MILLILITER(S): 9 INJECTION INTRAMUSCULAR; INTRAVENOUS; SUBCUTANEOUS at 09:15

## 2018-02-14 NOTE — PROGRESS NOTE ADULT - SUBJECTIVE AND OBJECTIVE BOX
Patient is a 67y old  Male who presents with a chief complaint of right hip pain (13 Feb 2018 03:21)      HPI:   66 yo S/P fall with acute right subcapital femoral neck fracture requiring ORIF. 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 Patient underwent surgery:      Diagnosis:    Pre-Op Diagnosis:  Closed fracture of neck of right femur, initial encounter  02/14/2018    Active  Philip Richards.     Post-Op Dx:  Closed fracture of neck of right femur, initial encounter  02/14/2018    Active  Philip Richards.    Procedure:    Procedure:  Hemiarthroplasty hip partial  02/14/2018    Active  [email or web address].       Operative Findings:  · Operative Findings	R femoral neck fracture	      MEDICATIONS  (STANDING):  aspirin enteric coated 325 milliGRAM(s) Oral two times a day  atorvastatin 10 milliGRAM(s) Oral at bedtime  ceFAZolin   IVPB 2000 milliGRAM(s) IV Intermittent every 8 hours  citalopram 20 milliGRAM(s) Oral daily  docusate sodium 100 milliGRAM(s) Oral three times a day  enalapril 10 milliGRAM(s) Oral two times a day  lactated ringers. 1000 milliLiter(s) (75 mL/Hr) IV Continuous <Continuous>  pantoprazole    Tablet 40 milliGRAM(s) Oral daily  polyethylene glycol 3350 17 Gram(s) Oral daily  traZODone 50 milliGRAM(s) Oral at bedtime    MEDICATIONS  (PRN):  acetaminophen   Tablet 650 milliGRAM(s) Oral every 6 hours PRN For Temp greater than 38 C (100.4 F)  acetaminophen   Tablet. 650 milliGRAM(s) Oral every 6 hours PRN Mild Pain (1 - 3)  aluminum hydroxide/magnesium hydroxide/simethicone Suspension 30 milliLiter(s) Oral four times a day PRN Indigestion  magnesium hydroxide Suspension 30 milliLiter(s) Oral daily PRN Constipation  morphine  - Injectable 2 milliGRAM(s) IV Push every 3 hours PRN breakthrough  ondansetron Injectable 4 milliGRAM(s) IV Push every 6 hours PRN Nausea and/or Vomiting  oxyCODONE    IR 5 milliGRAM(s) Oral every 4 hours PRN Moderate Pain (4 - 6)  oxyCODONE    IR 10 milliGRAM(s) Oral every 4 hours PRN Severe Pain (7 - 10)  senna 2 Tablet(s) Oral at bedtime PRN Constipation      Allergies    No Known Allergies    Intolerances      VITALS:   T(C): 36.7 (02-14-18 @ 16:52), Max: 36.7 (02-14-18 @ 05:45)  HR: 80 (02-14-18 @ 16:52) (80 - 118)  BP: 127/82 (02-14-18 @ 16:52) (93/60 - 139/86)  RR: 18 (02-14-18 @ 16:52) (14 - 18)  SpO2: 98% (02-14-18 @ 16:52) (93% - 100%)  Wt(kg): --    02-13 @ 07:01  -  02-14 @ 07:00  --------------------------------------------------------  IN: 75 mL / OUT: 500 mL / NET: -425 mL    02-14 @ 07:01  -  02-14 @ 18:14  --------------------------------------------------------  IN: 2125 mL / OUT: 500 mL / NET: 1625 mL        PHYSICAL EXAM:  GENERAL: NAD, well nourished and conversant  HEAD:  Atraumatic  EYES: EOM, PERRLA, conjunctiva pink and sclera white  ENT: No tonsillar erythema, exudates, or enlargement, moist mucous membranes, good dentition, no lesions  NECK: Supple, No JVD, normal thyroid, carotids with normal upstrokes and no bruits  CHEST/LUNG: Clear to auscultation bilaterally, No rales, rhonchi, wheezing, or rubs  HEART: Regular rate and rhythm, No murmurs, rubs, or gallops  ABDOMEN: Soft, nondistended, no masses, guarding, tenderness or rebound, bowel sounds present  EXTREMITIES:  2+ Peripheral Pulses, No clubbing, cyanosis, or edema.  s/p RIGHT hemiarthroplasty  LYMPH: No lymphadenopathy noted  SKIN: No rashes or lesions  NERVOUS SYSTEM:  Alert & Oriented X3, normal cognitive function.  Right sided weakness no change c/w pre-op    LABS:                          12.2   10.9  )-----------( 143      ( 14 Feb 2018 06:46 )             36.1     02-14    135  |  98  |  8   ----------------------------<  188<H>  3.8   |  24  |  0.78  02-13    136  |  98  |  11  ----------------------------<  140<H>  4.1   |  27  |  0.81  02-12    137  |  97  |  13  ----------------------------<  143<H>  3.6   |  30  |  0.84    Ca    7.9<L>      14 Feb 2018 06:46  Ca    8.9      13 Feb 2018 04:28  Ca    9.2      12 Feb 2018 23:11  Mg     1.9     02-12    TPro  7.1  /  Alb  4.3  /  TBili  0.3  /  DBili  x   /  AST  24  /  ALT  16  /  AlkPhos  75  02-12    CAPILLARY BLOOD GLUCOSE          RADIOLOGY & ADDITIONAL TESTS:      Consultant(s):    Care Discussed with Consultants/Other Providers [ ] YES  [ ] NO

## 2018-02-14 NOTE — CHART NOTE - NSCHARTNOTEFT_GEN_A_CORE
Resting without complaints.  No Chest Pain, SOB, N/V.  Patient has h/o CVA w/ R sided deficits    T(C): 36.7 (02-14-18 @ 15:52), Max: 36.7 (02-14-18 @ 05:45)  HR: 82 (02-14-18 @ 15:52) (80 - 118)  BP: 139/86 (02-14-18 @ 15:52) (93/60 - 139/86)  RR: 18 (02-14-18 @ 15:52) (14 - 18)  SpO2: 98% (02-14-18 @ 15:52) (93% - 100%)  Wt(kg): --    Exam:  Alert and [place], No Acute Distress  R hip dsg c/d/i with soft/compressible compartments  Calves soft, non-tender bilaterally  +PF/DF, limited 2/2 CVA  SILT  + DP pulse    Xray: Intact hardware                          12.2   10.9  )-----------( 143      ( 14 Feb 2018 06:46 )             36.1    02-14    135  |  98  |  8   ----------------------------<  188<H>  3.8   |  24  |  0.78    14 Feb 2018 06:46    A/P: 67yMale S/p R  hip hemiarthroplasty. Hx CVA.  VSS. NAD  -PT/OT-WBAT with post hip prec  -IS  -DVT PPx  -Pain Control  -Continue Current Tx      Jose Mancia PA-C  Team Pager #[phone]

## 2018-02-14 NOTE — PHYSICAL THERAPY INITIAL EVALUATION ADULT - ADDITIONAL COMMENTS
pt has 10 steps to his bedroom, a few steps with HR to enter.  pt reports being independent with ambulation and ADL's

## 2018-02-14 NOTE — BRIEF OPERATIVE NOTE - PROCEDURE
<<-----Click on this checkbox to enter Procedure Hemiarthroplasty hip partial  02/14/2018    Active  [email or web address]

## 2018-02-14 NOTE — PHYSICAL THERAPY INITIAL EVALUATION ADULT - PERTINENT HX OF CURRENT PROBLEM, REHAB EVAL
Pt is a 68yo M w/ hx of CVA with R residual hemparesis, hypertonia presents s/p fall with R hip fx.  Pt is now POD 1 s/p hemiarthroplasty

## 2018-02-14 NOTE — PHYSICAL THERAPY INITIAL EVALUATION ADULT - ACTIVE RANGE OF MOTION EXAMINATION, REHAB EVAL
no Active ROM deficits were identified/L side.  R side UE flexion contracture at elbow, no active hand movements.  R LE extensor tone ankle PF and inverted able to perform heel slide a few deg.

## 2018-02-14 NOTE — PROGRESS NOTE ADULT - ASSESSMENT
HPI:   66 yo S/P fall with acute right subcapital femoral neck fracture requiring ORIF. 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 Patient underwent surgery:      Diagnosis:    Pre-Op Diagnosis:  Closed fracture of neck of right femur, initial encounter  02/14/2018    Active  Philip Richards.     Post-Op Dx:  Closed fracture of neck of right femur, initial encounter  02/14/2018    Active  Philip Richards.    Procedure:    Procedure:  Hemiarthroplasty hip partial  02/14/2018    Active  [email or web address].       Operative Findings:  · Operative Findings	R femoral neck fracture

## 2018-02-15 LAB
ANION GAP SERPL CALC-SCNC: 13 MMOL/L — SIGNIFICANT CHANGE UP (ref 5–17)
BUN SERPL-MCNC: 11 MG/DL — SIGNIFICANT CHANGE UP (ref 7–23)
CALCIUM SERPL-MCNC: 8.6 MG/DL — SIGNIFICANT CHANGE UP (ref 8.4–10.5)
CHLORIDE SERPL-SCNC: 101 MMOL/L — SIGNIFICANT CHANGE UP (ref 96–108)
CO2 SERPL-SCNC: 26 MMOL/L — SIGNIFICANT CHANGE UP (ref 22–31)
CREAT SERPL-MCNC: 0.96 MG/DL — SIGNIFICANT CHANGE UP (ref 0.5–1.3)
GLUCOSE SERPL-MCNC: 183 MG/DL — HIGH (ref 70–99)
HCT VFR BLD CALC: 30.2 % — LOW (ref 39–50)
HGB BLD-MCNC: 10.3 G/DL — LOW (ref 13–17)
MCHC RBC-ENTMCNC: 31.6 PG — SIGNIFICANT CHANGE UP (ref 27–34)
MCHC RBC-ENTMCNC: 34.2 GM/DL — SIGNIFICANT CHANGE UP (ref 32–36)
MCV RBC AUTO: 92.5 FL — SIGNIFICANT CHANGE UP (ref 80–100)
PLATELET # BLD AUTO: 138 K/UL — LOW (ref 150–400)
POTASSIUM SERPL-MCNC: 3.9 MMOL/L — SIGNIFICANT CHANGE UP (ref 3.5–5.3)
POTASSIUM SERPL-SCNC: 3.9 MMOL/L — SIGNIFICANT CHANGE UP (ref 3.5–5.3)
RBC # BLD: 3.27 M/UL — LOW (ref 4.2–5.8)
RBC # FLD: 12.4 % — SIGNIFICANT CHANGE UP (ref 10.3–14.5)
SODIUM SERPL-SCNC: 140 MMOL/L — SIGNIFICANT CHANGE UP (ref 135–145)
WBC # BLD: 8.7 K/UL — SIGNIFICANT CHANGE UP (ref 3.8–10.5)
WBC # FLD AUTO: 8.7 K/UL — SIGNIFICANT CHANGE UP (ref 3.8–10.5)

## 2018-02-15 RX ORDER — TAMSULOSIN HYDROCHLORIDE 0.4 MG/1
0.4 CAPSULE ORAL AT BEDTIME
Qty: 0 | Refills: 0 | Status: DISCONTINUED | OUTPATIENT
Start: 2018-02-15 | End: 2018-02-16

## 2018-02-15 RX ORDER — SODIUM CHLORIDE 9 MG/ML
1000 INJECTION INTRAMUSCULAR; INTRAVENOUS; SUBCUTANEOUS
Qty: 0 | Refills: 0 | Status: DISCONTINUED | OUTPATIENT
Start: 2018-02-15 | End: 2018-02-16

## 2018-02-15 RX ORDER — SODIUM CHLORIDE 9 MG/ML
1000 INJECTION INTRAMUSCULAR; INTRAVENOUS; SUBCUTANEOUS ONCE
Qty: 0 | Refills: 0 | Status: COMPLETED | OUTPATIENT
Start: 2018-02-15 | End: 2018-02-15

## 2018-02-15 RX ADMIN — TAMSULOSIN HYDROCHLORIDE 0.4 MILLIGRAM(S): 0.4 CAPSULE ORAL at 23:33

## 2018-02-15 RX ADMIN — CITALOPRAM 20 MILLIGRAM(S): 10 TABLET, FILM COATED ORAL at 12:21

## 2018-02-15 RX ADMIN — Medication 100 MILLIGRAM(S): at 23:33

## 2018-02-15 RX ADMIN — Medication 10 MILLIGRAM(S): at 06:10

## 2018-02-15 RX ADMIN — Medication 325 MILLIGRAM(S): at 18:09

## 2018-02-15 RX ADMIN — GABAPENTIN 300 MILLIGRAM(S): 400 CAPSULE ORAL at 06:10

## 2018-02-15 RX ADMIN — Medication 325 MILLIGRAM(S): at 06:10

## 2018-02-15 RX ADMIN — GABAPENTIN 300 MILLIGRAM(S): 400 CAPSULE ORAL at 23:33

## 2018-02-15 RX ADMIN — ATORVASTATIN CALCIUM 10 MILLIGRAM(S): 80 TABLET, FILM COATED ORAL at 23:33

## 2018-02-15 RX ADMIN — Medication 10 MILLIGRAM(S): at 12:21

## 2018-02-15 RX ADMIN — GABAPENTIN 300 MILLIGRAM(S): 400 CAPSULE ORAL at 14:16

## 2018-02-15 RX ADMIN — SODIUM CHLORIDE 1000 MILLILITER(S): 9 INJECTION INTRAMUSCULAR; INTRAVENOUS; SUBCUTANEOUS at 12:11

## 2018-02-15 RX ADMIN — PANTOPRAZOLE SODIUM 40 MILLIGRAM(S): 20 TABLET, DELAYED RELEASE ORAL at 12:21

## 2018-02-15 RX ADMIN — Medication 100 MILLIGRAM(S): at 14:16

## 2018-02-15 RX ADMIN — SODIUM CHLORIDE 100 MILLILITER(S): 9 INJECTION INTRAMUSCULAR; INTRAVENOUS; SUBCUTANEOUS at 18:10

## 2018-02-15 RX ADMIN — OXYCODONE HYDROCHLORIDE 10 MILLIGRAM(S): 5 TABLET ORAL at 08:54

## 2018-02-15 RX ADMIN — Medication 50 MILLIGRAM(S): at 23:33

## 2018-02-15 RX ADMIN — POLYETHYLENE GLYCOL 3350 17 GRAM(S): 17 POWDER, FOR SOLUTION ORAL at 12:21

## 2018-02-15 RX ADMIN — Medication 10 MILLIGRAM(S): at 18:09

## 2018-02-15 RX ADMIN — Medication 100 MILLIGRAM(S): at 06:10

## 2018-02-15 RX ADMIN — OXYCODONE HYDROCHLORIDE 10 MILLIGRAM(S): 5 TABLET ORAL at 09:30

## 2018-02-15 NOTE — PROGRESS NOTE ADULT - ASSESSMENT
S/P Rt hip hemiarthroplasty    Plan    ck labs  OOB/PT/WBAT         Katrina Olea PA-C   Beeper    [phone]

## 2018-02-15 NOTE — OCCUPATIONAL THERAPY INITIAL EVALUATION ADULT - DIAGNOSIS, OT EVAL
Decreased strength, balance, and endurance impacting ability to perform ADLs and functional mobility.

## 2018-02-15 NOTE — OCCUPATIONAL THERAPY INITIAL EVALUATION ADULT - FINE MOTOR COORDINATION, RIGHT HAND THUMB/FINGER OPPOSITION SKILLS, OT EVAL
Patient unable to perform action due to decreased strength in R hand, unable to fully extend fingers./severe impairment

## 2018-02-15 NOTE — PROGRESS NOTE ADULT - SUBJECTIVE AND OBJECTIVE BOX
Patient is a 67y old  Male who presents with a chief complaint of right hip pain (13 Feb 2018 03:21)      POST OPERATIVE DAY #:  1  Patient comfortable  No complaints    VS:  T(C): 37.7 (02-15-18 @ 06:08), Max: 37.7 (02-15-18 @ 06:08)  T(F): 99.9 (02-15-18 @ 06:08), Max: 99.9 (02-15-18 @ 06:08)  HR: 100 (02-15-18 @ 06:08) (80 - 118)  BP: 129/79 (02-15-18 @ 06:08) (93/60 - 139/86)  RR: 18 (02-15-18 @ 06:08) (14 - 18)  SpO2: 95% (02-15-18 @ 06:08) (94% - 100%)  Wt(kg): --      PHYSICAL EXAM:  NAD, Alert  EXT:   Rt Hip: Dressing C/D/I;   No Calf Tenderness  (+) DF/PF; (+) Distal Pulses;  Sensation: No gross deficits noted      B/L, PAS     LABS:                        12.2<L>  10.9<H> )-----------( 143<L>    ( 14 Feb 2018 06:46 )             36.1<L>    02-14    135  |  98  |  8   ----------------------------<  188<H>  3.8   |  24  |  0.78

## 2018-02-15 NOTE — OCCUPATIONAL THERAPY INITIAL EVALUATION ADULT - TRANSFER SAFETY CONCERNS NOTED: SIT/STAND, REHAB EVAL
losing balance/decreased weight-shifting ability/decreased step length/decreased balance during turns

## 2018-02-15 NOTE — OCCUPATIONAL THERAPY INITIAL EVALUATION ADULT - MANUAL MUSCLE TESTING RESULTS, REHAB EVAL
grossly assessed due to/LUE 3/5, RUE 3/5 with AAROM, LLE 3/5, RLE 2-/5 grossly assessed due to/LUE 3/5, RUE 1/5 , LLE 3/5, RLE 2-/5

## 2018-02-15 NOTE — PROGRESS NOTE ADULT - ASSESSMENT
HPI:   68 yo S/P fall with acute right subcapital femoral neck fracture requiring ORIF. 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 Patient tolerated procedure well

## 2018-02-15 NOTE — PROVIDER CONTACT NOTE (OTHER) - ACTION/TREATMENT ORDERED:
Bladder scan, insertion of indwelling Torres catheter. output 1150 of clear yellow urine. Continue to monitor

## 2018-02-15 NOTE — OCCUPATIONAL THERAPY INITIAL EVALUATION ADULT - PERTINENT HX OF CURRENT PROBLEM, REHAB EVAL
67y M presents c/o R hip pain and inability to ambulate sp questionable syncopal fall today. Pt felt slightly dizzy after sitting up and slipped off bed onto hip. Possible HS but denies LOC. Pt. baseline has RUE weakness and RLE weakness 2/2 stroke 5 years ago.

## 2018-02-15 NOTE — PROGRESS NOTE ADULT - SUBJECTIVE AND OBJECTIVE BOX
66 yo S/P fall with acute right subcapital femoral neck fracture requiring ORIF. 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 Patient underwent surgery:      MEDICATIONS  (STANDING):  aspirin enteric coated 325 milliGRAM(s) Oral two times a day  atorvastatin 10 milliGRAM(s) Oral at bedtime  baclofen 10 milliGRAM(s) Oral daily  citalopram 20 milliGRAM(s) Oral daily  docusate sodium 100 milliGRAM(s) Oral three times a day  enalapril 10 milliGRAM(s) Oral two times a day  gabapentin 300 milliGRAM(s) Oral three times a day  lactated ringers. 1000 milliLiter(s) (75 mL/Hr) IV Continuous <Continuous>  pantoprazole    Tablet 40 milliGRAM(s) Oral daily  polyethylene glycol 3350 17 Gram(s) Oral daily  sodium chloride 0.9%. 1000 milliLiter(s) (100 mL/Hr) IV Continuous <Continuous>  tamsulosin 0.4 milliGRAM(s) Oral at bedtime  traZODone 50 milliGRAM(s) Oral at bedtime    MEDICATIONS  (PRN):  acetaminophen   Tablet 650 milliGRAM(s) Oral every 6 hours PRN For Temp greater than 38 C (100.4 F)  acetaminophen   Tablet. 650 milliGRAM(s) Oral every 6 hours PRN Mild Pain (1 - 3)  aluminum hydroxide/magnesium hydroxide/simethicone Suspension 30 milliLiter(s) Oral four times a day PRN Indigestion  magnesium hydroxide Suspension 30 milliLiter(s) Oral daily PRN Constipation  morphine  - Injectable 2 milliGRAM(s) IV Push every 3 hours PRN breakthrough  ondansetron Injectable 4 milliGRAM(s) IV Push every 6 hours PRN Nausea and/or Vomiting  oxyCODONE    IR 5 milliGRAM(s) Oral every 4 hours PRN Moderate Pain (4 - 6)  oxyCODONE    IR 10 milliGRAM(s) Oral every 4 hours PRN Severe Pain (7 - 10)  senna 2 Tablet(s) Oral at bedtime PRN Constipation          VITALS:   T(C): 37.2 (02-15-18 @ 16:04), Max: 37.7 (02-15-18 @ 06:08)  HR: 92 (02-15-18 @ 16:04) (87 - 114)  BP: 114/71 (02-15-18 @ 16:04) (100/66 - 145/80)  RR: 18 (02-15-18 @ 16:04) (18 - 18)  SpO2: 95% (02-15-18 @ 16:04) (94% - 98%)  Wt(kg): --      PHYSICAL EXAM:  GENERAL: NAD, well nourished and conversant  HEAD:  Atraumatic  EYES: EOM, PERRLA, conjunctiva pink and sclera white  ENT: No tonsillar erythema, exudates, or enlargement, moist mucous membranes, good dentition, no lesions  NECK: Supple, No JVD, normal thyroid, carotids with normal upstrokes and no bruits  CHEST/LUNG: Clear to auscultation bilaterally, No rales, rhonchi, wheezing, or rubs  HEART: Regular rate and rhythm, No murmurs, rubs, or gallops  ABDOMEN: Soft, nondistended, no masses, guarding, tenderness or rebound, bowel sounds present  EXTREMITIES:  2+ Peripheral Pulses, No clubbing, cyanosis, or edema.  s/p RIGHT hemiarthroplasty  LYMPH: No lymphadenopathy noted  SKIN: No rashes or lesions  NERVOUS SYSTEM:  Alert & Oriented X3, normal cognitive function.  Right sided weakness no change c/w pre-op    LABS:        CBC Full  -  ( 15 Feb 2018 10:08 )  WBC Count : 8.7 K/uL  Hemoglobin : 10.3 g/dL  Hematocrit : 30.2 %  Platelet Count - Automated : 138 K/uL  Mean Cell Volume : 92.5 fl  Mean Cell Hemoglobin : 31.6 pg  Mean Cell Hemoglobin Concentration : 34.2 gm/dL  Auto Neutrophil # : x  Auto Lymphocyte # : x  Auto Monocyte # : x  Auto Eosinophil # : x  Auto Basophil # : x  Auto Neutrophil % : x  Auto Lymphocyte % : x  Auto Monocyte % : x  Auto Eosinophil % : x  Auto Basophil % : x    02-15    140  |  101  |  11  ----------------------------<  183<H>  3.9   |  26  |  0.96    Ca    8.6      15 Feb 2018 10:07            CAPILLARY BLOOD GLUCOSE          RADIOLOGY & ADDITIONAL TESTS:

## 2018-02-15 NOTE — OCCUPATIONAL THERAPY INITIAL EVALUATION ADULT - ADDITIONAL COMMENTS
2/14/18 s/p hemiarthroplasty hip partial  2/13/18 X-ray pelvis/hip/femur: Acute subcapital fracture of the right femoral neck.

## 2018-02-15 NOTE — PROVIDER CONTACT NOTE (OTHER) - BACKGROUND
Patient has history of incontinence, status post fall, right hip merlene, past cva with right sided weakness

## 2018-02-16 ENCOUNTER — TRANSCRIPTION ENCOUNTER (OUTPATIENT)
Age: 68
End: 2018-02-16

## 2018-02-16 VITALS
SYSTOLIC BLOOD PRESSURE: 102 MMHG | HEART RATE: 86 BPM | OXYGEN SATURATION: 95 % | DIASTOLIC BLOOD PRESSURE: 65 MMHG | RESPIRATION RATE: 16 BRPM | TEMPERATURE: 98 F

## 2018-02-16 PROBLEM — I63.9 CEREBRAL INFARCTION, UNSPECIFIED: Chronic | Status: ACTIVE | Noted: 2018-02-12

## 2018-02-16 LAB — SURGICAL PATHOLOGY STUDY: SIGNIFICANT CHANGE UP

## 2018-02-16 PROCEDURE — 71045 X-RAY EXAM CHEST 1 VIEW: CPT

## 2018-02-16 PROCEDURE — 85610 PROTHROMBIN TIME: CPT

## 2018-02-16 PROCEDURE — 88305 TISSUE EXAM BY PATHOLOGIST: CPT

## 2018-02-16 PROCEDURE — 84484 ASSAY OF TROPONIN QUANT: CPT

## 2018-02-16 PROCEDURE — 88311 DECALCIFY TISSUE: CPT

## 2018-02-16 PROCEDURE — 97530 THERAPEUTIC ACTIVITIES: CPT

## 2018-02-16 PROCEDURE — 85730 THROMBOPLASTIN TIME PARTIAL: CPT

## 2018-02-16 PROCEDURE — 80053 COMPREHEN METABOLIC PANEL: CPT

## 2018-02-16 PROCEDURE — 82553 CREATINE MB FRACTION: CPT

## 2018-02-16 PROCEDURE — 99285 EMERGENCY DEPT VISIT HI MDM: CPT | Mod: 25

## 2018-02-16 PROCEDURE — 82550 ASSAY OF CK (CPK): CPT

## 2018-02-16 PROCEDURE — 96374 THER/PROPH/DIAG INJ IV PUSH: CPT

## 2018-02-16 PROCEDURE — 97163 PT EVAL HIGH COMPLEX 45 MIN: CPT

## 2018-02-16 PROCEDURE — C1776: CPT

## 2018-02-16 PROCEDURE — 73522 X-RAY EXAM HIPS BI 3-4 VIEWS: CPT

## 2018-02-16 PROCEDURE — 97166 OT EVAL MOD COMPLEX 45 MIN: CPT

## 2018-02-16 PROCEDURE — 83735 ASSAY OF MAGNESIUM: CPT

## 2018-02-16 PROCEDURE — 81001 URINALYSIS AUTO W/SCOPE: CPT

## 2018-02-16 PROCEDURE — 73552 X-RAY EXAM OF FEMUR 2/>: CPT

## 2018-02-16 PROCEDURE — 93005 ELECTROCARDIOGRAM TRACING: CPT

## 2018-02-16 PROCEDURE — 80048 BASIC METABOLIC PNL TOTAL CA: CPT

## 2018-02-16 PROCEDURE — 86901 BLOOD TYPING SEROLOGIC RH(D): CPT

## 2018-02-16 PROCEDURE — 85027 COMPLETE CBC AUTOMATED: CPT

## 2018-02-16 PROCEDURE — 97116 GAIT TRAINING THERAPY: CPT

## 2018-02-16 PROCEDURE — 72170 X-RAY EXAM OF PELVIS: CPT

## 2018-02-16 PROCEDURE — 76377 3D RENDER W/INTRP POSTPROCES: CPT

## 2018-02-16 PROCEDURE — 86900 BLOOD TYPING SEROLOGIC ABO: CPT

## 2018-02-16 PROCEDURE — 72192 CT PELVIS W/O DYE: CPT

## 2018-02-16 PROCEDURE — 73502 X-RAY EXAM HIP UNI 2-3 VIEWS: CPT

## 2018-02-16 PROCEDURE — 86850 RBC ANTIBODY SCREEN: CPT

## 2018-02-16 RX ORDER — POLYETHYLENE GLYCOL 3350 17 G/17G
17 POWDER, FOR SOLUTION ORAL
Qty: 0 | Refills: 0 | COMMUNITY
Start: 2018-02-16

## 2018-02-16 RX ORDER — ASPIRIN/CALCIUM CARB/MAGNESIUM 324 MG
1 TABLET ORAL
Qty: 0 | Refills: 0 | COMMUNITY

## 2018-02-16 RX ORDER — ACETAMINOPHEN 500 MG
2 TABLET ORAL
Qty: 0 | Refills: 0 | COMMUNITY
Start: 2018-02-16

## 2018-02-16 RX ORDER — OXYCODONE HYDROCHLORIDE 5 MG/1
1 TABLET ORAL
Qty: 0 | Refills: 0 | COMMUNITY
Start: 2018-02-16

## 2018-02-16 RX ORDER — TAMSULOSIN HYDROCHLORIDE 0.4 MG/1
1 CAPSULE ORAL
Qty: 0 | Refills: 0 | COMMUNITY
Start: 2018-02-16

## 2018-02-16 RX ORDER — DOCUSATE SODIUM 100 MG
1 CAPSULE ORAL
Qty: 0 | Refills: 0 | COMMUNITY
Start: 2018-02-16

## 2018-02-16 RX ORDER — ASPIRIN/CALCIUM CARB/MAGNESIUM 324 MG
1 TABLET ORAL
Qty: 0 | Refills: 0 | COMMUNITY
Start: 2018-02-16

## 2018-02-16 RX ADMIN — Medication 10 MILLIGRAM(S): at 11:31

## 2018-02-16 RX ADMIN — SODIUM CHLORIDE 100 MILLILITER(S): 9 INJECTION INTRAMUSCULAR; INTRAVENOUS; SUBCUTANEOUS at 11:34

## 2018-02-16 RX ADMIN — POLYETHYLENE GLYCOL 3350 17 GRAM(S): 17 POWDER, FOR SOLUTION ORAL at 11:32

## 2018-02-16 RX ADMIN — PANTOPRAZOLE SODIUM 40 MILLIGRAM(S): 20 TABLET, DELAYED RELEASE ORAL at 11:31

## 2018-02-16 RX ADMIN — CITALOPRAM 20 MILLIGRAM(S): 10 TABLET, FILM COATED ORAL at 11:31

## 2018-02-16 RX ADMIN — Medication 325 MILLIGRAM(S): at 05:37

## 2018-02-16 RX ADMIN — Medication 100 MILLIGRAM(S): at 05:37

## 2018-02-16 RX ADMIN — GABAPENTIN 300 MILLIGRAM(S): 400 CAPSULE ORAL at 05:37

## 2018-02-16 RX ADMIN — Medication 10 MILLIGRAM(S): at 05:37

## 2018-02-16 NOTE — PROGRESS NOTE ADULT - ASSESSMENT
HPI:   68 yo S/P fall with acute right subcapital femoral neck fracture requiring ORIF. 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 Patient tolerated procedure well 66 yo S/P fall with acute right subcapital femoral neck fracture requiring ORIF. 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 Patient underwent surgery with no medical complications to date. Awaiting rehabilitation bed availability for discharge planned for later today.

## 2018-02-16 NOTE — DISCHARGE NOTE ADULT - MEDICATION SUMMARY - MEDICATIONS TO TAKE
I will START or STAY ON the medications listed below when I get home from the hospital:    acetaminophen 325 mg oral tablet  -- 2 tab(s) by mouth every 6 hours, As needed, For Temp greater than 38 C (100.4 F)  -- Indication: For fever, H/A    acetaminophen 325 mg oral tablet  -- 2 tab(s) by mouth every 6 hours, As needed, Mild Pain (1 - 3)  -- Indication: For mild pain    oxyCODONE 5 mg oral tablet  -- 1 tab(s) by mouth every 4 hours, As needed, Moderate Pain (4 - 6)  -- Indication: For moderate pain    aspirin 325 mg oral delayed release tablet  -- 1 tab(s) by mouth 2 times a day for 6 weeks post op  -- Indication: For antiplatelet therapy, stay on this dosage for 6 weeks post op    enalapril 10 mg oral tablet  -- 1 tab(s) by mouth 2 times a day  -- Indication: For Cardiovascular agent    tamsulosin 0.4 mg oral capsule  -- 1 cap(s) by mouth once a day (at bedtime)  -- Indication: For urinary tract agent    gabapentin 300 mg oral tablet  -- 1 tab(s) by mouth 3 times a day  -- Indication: For Neuropathic pain agent    CeleXA 20 mg oral tablet  -- 1 tab(s) by mouth once a day  -- Indication: For mood stabilizer    traZODone 50 mg oral tablet  -- 1 tab(s) by mouth once a day  -- Indication: For Sleep aid    pravastatin 40 mg oral tablet  -- 1 tab(s) by mouth once a day  -- Indication: For antihyperlipidemic agent    docusate sodium 100 mg oral capsule  -- 1 cap(s) by mouth 3 times a day  -- Indication: For Stool softener    polyethylene glycol 3350 oral powder for reconstitution  -- 17 gram(s) by mouth once a day  -- Indication: For laxative    baclofen 10 mg oral tablet  -- 1 tab(s) by mouth once a day  -- Indication: For muscle relaxant

## 2018-02-16 NOTE — DISCHARGE NOTE ADULT - HOSPITAL COURSE
Chief Complaint/Reason for Admission: right hip pain	  History of Present Illness: 	  67y Male community ambulatory with cane presents c/o R hip pain and inability to ambulate sp questionable syncopal fall today. Pt felt slightly dizzy after sitting up and slipped off bed onto hip. Possible HS but denies LOC. Denies numbness/tingling. Denies fever/chills. Denies pain/injury elsewhere. pt baseline has Right UE weakness and RLE weakness 2/2 stroke 5 years ago. As per pt he is able to ambulate daily multiple miles and uses a cane.    No Known Allergies      Home Medications:   * Outpatient Medication Status not yet specified  Patient History:    Past Medical History:  CVA (cerebral vascular accident)    Weakness due to acute cerebrovascular accident (CVA)  right side.     Past Surgical History:  No significant past surgical history    Hospital Course:  66 y/o M underwent right hip hemiarthroplasty on 2/14/18 with Dr.H. Rojas.  Patient tolerated procedure well.  Patient was evaluated postoperatively by physical and occupational  therapists for weight bearing as tolerated ambulation with rolling walker and total hip precautions and advised that patient would benefit from admission to rehab facility. Patient advised to keep surgical incision/dressing clean and dry, and have dressing and surgical staples removed and steri strips applied post op day #14(if applicable).  Patient further advised to follow up with  3-4 weeks post op. Chief Complaint/Reason for Admission: right hip pain	  History of Present Illness: 	  67y Male community ambulatory with cane presents c/o R hip pain and inability to ambulate sp questionable syncopal fall today. Pt felt slightly dizzy after sitting up and slipped off bed onto hip. Possible HS but denies LOC. Denies numbness/tingling. Denies fever/chills. Denies pain/injury elsewhere. pt baseline has Right UE weakness and RLE weakness 2/2 stroke 5 years ago. As per pt he is able to ambulate daily multiple miles and uses a cane.    No Known Allergies      Home Medications:   * Outpatient Medication Status not yet specified  Patient History:    Past Medical History:  CVA (cerebral vascular accident)    Weakness due to acute cerebrovascular accident (CVA)  right side.     Past Surgical History:  No significant past surgical history    Hospital Course:  66 y/o M underwent right hip hemiarthroplasty on 2/14/18 with Dr.H. Rojas.  Patient tolerated procedure well.  Patient was evaluated postoperatively by physical and occupational  therapists for weight bearing as tolerated ambulation with rolling walker and total hip precautions and advised that patient would benefit from admission to rehab facility. Patient was found to be in urinary retention and Torrse was inserted, patient should have new trial of void once he is more ambulatory.  Patient advised to keep surgical incision/dressing clean and dry, and have dressing and surgical staples removed and steri strips applied post op day #14(if applicable).  Patient further advised to follow up with  3-4 weeks post op.

## 2018-02-16 NOTE — DISCHARGE NOTE ADULT - CARE PROVIDER_API CALL
Gloria Rojas (MD), Orthopaedic Surgery  [address] [place] [id number]  Phone: [phone]  Fax: [phone]

## 2018-02-16 NOTE — PROGRESS NOTE ADULT - SUBJECTIVE AND OBJECTIVE BOX
ORTHO  Patient is a 67y old  Male who presents with a chief complaint of right hip pain (13 Feb 2018 03:21)    Pt. resting without complaint    VS-  T(C): 37.4 (02-16-18 @ 05:07), Max: 37.7 (02-15-18 @ 08:07)  HR: 95 (02-16-18 @ 05:07) (87 - 98)  BP: 111/74 (02-16-18 @ 05:07) (100/66 - 145/80)  RR: 18 (02-16-18 @ 05:07) (18 - 18)  SpO2: 94% (02-16-18 @ 05:07) (94% - 97%)  Wt(kg): --    M.S.  A&O  Extremity- Right hip dressing C/D/I  Neuro-              Motor-(+) Ankle DF/PF              Sensation- grossly intact to light touch              Calves- soft, nontender- PAS                               10.3   8.7   )-----------( 138      ( 15 Feb 2018 10:08 )             30.2     02-15    140  |  101  |  11  ----------------------------<  183<H>  3.9   |  26  |  0.96    Ca    8.6      15 Feb 2018 10:07

## 2018-02-16 NOTE — DISCHARGE NOTE ADULT - ADDITIONAL INSTRUCTIONS
Keep surgical incision/dressing clean and dry. Continue weight bearing as tolerated ambulation/physical therapy with rolling walker and total hip precautions . Follow up with Dr. Rojas 3-4 weeks post op. Have sutures/staples removed and steri-strips applied (if applicable) post operative day #14 Keep surgical incision/dressing clean and dry. Continue weight bearing as tolerated ambulation/physical therapy with rolling walker and total hip precautions . Follow up with Dr. Rojas 3-4 weeks post op. Have sutures/staples removed and steri-strips applied (if applicable) post operative day #14. Follow up with your primary care provider once discharged from rehab facility.

## 2018-02-16 NOTE — DISCHARGE NOTE ADULT - PATIENT PORTAL LINK FT
You can access the [facility], offered by [facility], by registering with the following website: [email or web address]

## 2018-02-16 NOTE — PROGRESS NOTE ADULT - ATTENDING COMMENTS
Cleared by orthopedics for discharge to rehabilitation.  Full instructions provided regarding diagnosis, treatment, discharge medications, diet and follow up care on transfer sheet. Medically stable and for discharge to rehabilitation today as planned.

## 2018-02-16 NOTE — DISCHARGE NOTE ADULT - CARE PLAN
Principal Discharge DX:	Closed fracture of neck of right femur, initial encounter  Goal:	surgical intervention should result in improved function  Assessment and plan of treatment:	continue weight bearing as tolerated ambulation/physical therapy, with rolling walker and total hip precautions

## 2018-02-16 NOTE — PROGRESS NOTE ADULT - SUBJECTIVE AND OBJECTIVE BOX
66 yo S/P fall with acute right subcapital femoral neck fracture requiring ORIF. 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 Patient underwent surgery:        MEDICATIONS  (STANDING):  aspirin enteric coated 325 milliGRAM(s) Oral two times a day  atorvastatin 10 milliGRAM(s) Oral at bedtime  baclofen 10 milliGRAM(s) Oral daily  citalopram 20 milliGRAM(s) Oral daily  docusate sodium 100 milliGRAM(s) Oral three times a day  enalapril 10 milliGRAM(s) Oral two times a day  gabapentin 300 milliGRAM(s) Oral three times a day  lactated ringers. 1000 milliLiter(s) (75 mL/Hr) IV Continuous <Continuous>  pantoprazole    Tablet 40 milliGRAM(s) Oral daily  polyethylene glycol 3350 17 Gram(s) Oral daily  sodium chloride 0.9%. 1000 milliLiter(s) (100 mL/Hr) IV Continuous <Continuous>  tamsulosin 0.4 milliGRAM(s) Oral at bedtime  traZODone 50 milliGRAM(s) Oral at bedtime    MEDICATIONS  (PRN):  acetaminophen   Tablet 650 milliGRAM(s) Oral every 6 hours PRN For Temp greater than 38 C (100.4 F)  acetaminophen   Tablet. 650 milliGRAM(s) Oral every 6 hours PRN Mild Pain (1 - 3)  aluminum hydroxide/magnesium hydroxide/simethicone Suspension 30 milliLiter(s) Oral four times a day PRN Indigestion  bisacodyl Suppository 10 milliGRAM(s) Rectal daily PRN If no bowel movement by POD#2  magnesium hydroxide Suspension 30 milliLiter(s) Oral daily PRN Constipation  morphine  - Injectable 2 milliGRAM(s) IV Push every 3 hours PRN breakthrough  ondansetron Injectable 4 milliGRAM(s) IV Push every 6 hours PRN Nausea and/or Vomiting  oxyCODONE    IR 5 milliGRAM(s) Oral every 4 hours PRN Moderate Pain (4 - 6)  oxyCODONE    IR 10 milliGRAM(s) Oral every 4 hours PRN Severe Pain (7 - 10)  senna 2 Tablet(s) Oral at bedtime PRN Constipation      Vital Signs Last 24 Hrs  T(C): 37.7 (16 Feb 2018 08:55), Max: 37.7 (16 Feb 2018 08:55)  T(F): 99.8 (16 Feb 2018 08:55), Max: 99.8 (16 Feb 2018 08:55)  HR: 81 (16 Feb 2018 08:55) (81 - 98)  BP: 99/65 (16 Feb 2018 08:55) (99/65 - 118/67)  BP(mean): --  RR: 18 (16 Feb 2018 08:55) (18 - 18)  SpO2: 94% (16 Feb 2018 08:55) (94% - 97%)Wt(kg): --      PHYSICAL EXAM:  GENERAL: NAD, well nourished and conversant  HEAD:  Atraumatic  EYES: EOM, PERRLA, conjunctiva pink and sclera white  ENT: No tonsillar erythema, exudates, or enlargement, moist mucous membranes, good dentition, no lesions  NECK: Supple, No JVD, normal thyroid, carotids with normal upstrokes and no bruits  CHEST/LUNG: Clear to auscultation bilaterally, No rales, rhonchi, wheezing, or rubs  HEART: Regular rate and rhythm, No murmurs, rubs, or gallops  ABDOMEN: Soft, nondistended, no masses, guarding, tenderness or rebound, bowel sounds present  EXTREMITIES:  2+ Peripheral Pulses, No clubbing, cyanosis, or edema.  s/p RIGHT hemiarthroplasty  LYMPH: No lymphadenopathy noted  SKIN: No rashes or lesions  NERVOUS SYSTEM:  Alert & Oriented X3, normal cognitive function.  Right sided weakness no change c/w pre-op    LABS:      CBC Full  -  ( 15 Feb 2018 10:08 )  WBC Count : 8.7 K/uL  Hemoglobin : 10.3 g/dL  Hematocrit : 30.2 %  Platelet Count - Automated : 138 K/uL  Mean Cell Volume : 92.5 fl  Mean Cell Hemoglobin : 31.6 pg  Mean Cell Hemoglobin Concentration : 34.2 gm/dL  Auto Neutrophil # : x  Auto Lymphocyte # : x  Auto Monocyte # : x  Auto Eosinophil # : x  Auto Basophil # : x  Auto Neutrophil % : x  Auto Lymphocyte % : x  Auto Monocyte % : x  Auto Eosinophil % : x  Auto Basophil % : x    02-15    140  |  101  |  11  ----------------------------<  183<H>  3.9   |  26  |  0.96    Ca    8.6      15 Feb 2018 10:07              RADIOLOGY & ADDITIONAL TESTS: 68 yo S/P fall with acute right subcapital femoral neck fracture requiring ORIF. 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 Patient underwent surgery with no medical complications to date. Awaiting rehabilitation bed availability for discharge planned for later today.      MEDICATIONS  (STANDING):  aspirin enteric coated 325 milliGRAM(s) Oral two times a day  atorvastatin 10 milliGRAM(s) Oral at bedtime  baclofen 10 milliGRAM(s) Oral daily  citalopram 20 milliGRAM(s) Oral daily  docusate sodium 100 milliGRAM(s) Oral three times a day  enalapril 10 milliGRAM(s) Oral two times a day  gabapentin 300 milliGRAM(s) Oral three times a day  lactated ringers. 1000 milliLiter(s) (75 mL/Hr) IV Continuous <Continuous>  pantoprazole    Tablet 40 milliGRAM(s) Oral daily  polyethylene glycol 3350 17 Gram(s) Oral daily  sodium chloride 0.9%. 1000 milliLiter(s) (100 mL/Hr) IV Continuous <Continuous>  tamsulosin 0.4 milliGRAM(s) Oral at bedtime  traZODone 50 milliGRAM(s) Oral at bedtime    MEDICATIONS  (PRN):  acetaminophen   Tablet 650 milliGRAM(s) Oral every 6 hours PRN For Temp greater than 38 C (100.4 F)  acetaminophen   Tablet. 650 milliGRAM(s) Oral every 6 hours PRN Mild Pain (1 - 3)  aluminum hydroxide/magnesium hydroxide/simethicone Suspension 30 milliLiter(s) Oral four times a day PRN Indigestion  bisacodyl Suppository 10 milliGRAM(s) Rectal daily PRN If no bowel movement by POD#2  magnesium hydroxide Suspension 30 milliLiter(s) Oral daily PRN Constipation  morphine  - Injectable 2 milliGRAM(s) IV Push every 3 hours PRN breakthrough  ondansetron Injectable 4 milliGRAM(s) IV Push every 6 hours PRN Nausea and/or Vomiting  oxyCODONE    IR 5 milliGRAM(s) Oral every 4 hours PRN Moderate Pain (4 - 6)  oxyCODONE    IR 10 milliGRAM(s) Oral every 4 hours PRN Severe Pain (7 - 10)  senna 2 Tablet(s) Oral at bedtime PRN Constipation      Vital Signs Last 24 Hrs  T(C): 37.7 (16 Feb 2018 08:55), Max: 37.7 (16 Feb 2018 08:55)  T(F): 99.8 (16 Feb 2018 08:55), Max: 99.8 (16 Feb 2018 08:55)  HR: 81 (16 Feb 2018 08:55) (81 - 98)  BP: 99/65 (16 Feb 2018 08:55) (99/65 - 118/67)  BP(mean): --  RR: 18 (16 Feb 2018 08:55) (18 - 18)  SpO2: 94% (16 Feb 2018 08:55) (94% - 97%)Wt(kg): --      PHYSICAL EXAM:  GENERAL: NAD, well nourished and conversant  HEAD:  Atraumatic  EYES: EOM, PERRLA, conjunctiva pink and sclera white  ENT: No tonsillar erythema, exudates, or enlargement, moist mucous membranes, good dentition, no lesions  NECK: Supple, No JVD, normal thyroid, carotids with normal upstrokes and no bruits  CHEST/LUNG: Clear to auscultation bilaterally, No rales, rhonchi, wheezing, or rubs  HEART: Regular rate and rhythm, No murmurs, rubs, or gallops  ABDOMEN: Soft, nondistended, no masses, guarding, tenderness or rebound, bowel sounds present  EXTREMITIES:  2+ Peripheral Pulses, No clubbing, cyanosis, or edema.  s/p RIGHT hemiarthroplasty  LYMPH: No lymphadenopathy noted  SKIN: No rashes or lesions  NERVOUS SYSTEM:  Alert & Oriented X3, normal cognitive function.  Right sided weakness no change c/w pre-op    LABS:      CBC Full  -  ( 15 Feb 2018 10:08 )  WBC Count : 8.7 K/uL  Hemoglobin : 10.3 g/dL  Hematocrit : 30.2 %  Platelet Count - Automated : 138 K/uL  Mean Cell Volume : 92.5 fl  Mean Cell Hemoglobin : 31.6 pg  Mean Cell Hemoglobin Concentration : 34.2 gm/dL  Auto Neutrophil # : x  Auto Lymphocyte # : x  Auto Monocyte # : x  Auto Eosinophil # : x  Auto Basophil # : x  Auto Neutrophil % : x  Auto Lymphocyte % : x  Auto Monocyte % : x  Auto Eosinophil % : x  Auto Basophil % : x    02-15    140  |  101  |  11  ----------------------------<  183<H>  3.9   |  26  |  0.96    Ca    8.6      15 Feb 2018 10:07              RADIOLOGY & ADDITIONAL TESTS:

## 2018-02-16 NOTE — PROGRESS NOTE ADULT - ASSESSMENT
Impression: Stable       Plan:   Continue present treatment                 Out of bed, ambulate, weight bearing as tolerated                  Physical therapy follow up                  Continue to monitor    Al Sheikh PA-C  Orthopaedic Surgery  Team pager [phone]  [phone]

## 2018-02-16 NOTE — DISCHARGE NOTE ADULT - PLAN OF CARE
surgical intervention should result in improved function continue weight bearing as tolerated ambulation/physical therapy, with rolling walker and total hip precautions

## 2018-02-16 NOTE — DISCHARGE NOTE ADULT - NS AS ACTIVITY OBS
Do not make important decisions/Do not drive or operate machinery/No Heavy lifting/straining/continue weight bearing as tolerated ambulation/ physical therapy with rolling walker, and totla hip precautions/Walking-Indoors allowed/Walking-Outdoors allowed/Stairs allowed

## 2018-02-23 ENCOUNTER — EMERGENCY (EMERGENCY)
Facility: HOSPITAL | Age: 68
LOS: 1 days | Discharge: HOME | End: 2018-02-23
Attending: EMERGENCY MEDICINE | Admitting: EMERGENCY MEDICINE
Payer: MEDICARE

## 2018-02-23 VITALS
DIASTOLIC BLOOD PRESSURE: 77 MMHG | OXYGEN SATURATION: 100 % | SYSTOLIC BLOOD PRESSURE: 115 MMHG | RESPIRATION RATE: 16 BRPM | HEART RATE: 70 BPM | TEMPERATURE: 98 F

## 2018-02-23 VITALS
RESPIRATION RATE: 18 BRPM | SYSTOLIC BLOOD PRESSURE: 122 MMHG | OXYGEN SATURATION: 98 % | DIASTOLIC BLOOD PRESSURE: 85 MMHG | TEMPERATURE: 98 F | HEART RATE: 71 BPM

## 2018-02-23 LAB
ALBUMIN SERPL ELPH-MCNC: 3.2 G/DL — LOW (ref 3.3–5)
ALP SERPL-CCNC: 68 U/L — SIGNIFICANT CHANGE UP (ref 40–120)
ALT FLD-CCNC: 22 U/L RC — SIGNIFICANT CHANGE UP (ref 10–45)
ANION GAP SERPL CALC-SCNC: 11 MMOL/L — SIGNIFICANT CHANGE UP (ref 5–17)
APTT BLD: 26.6 SEC — LOW (ref 27.5–37.4)
AST SERPL-CCNC: 18 U/L — SIGNIFICANT CHANGE UP (ref 10–40)
BASOPHILS # BLD AUTO: 0 K/UL — SIGNIFICANT CHANGE UP (ref 0–0.2)
BASOPHILS NFR BLD AUTO: 0.5 % — SIGNIFICANT CHANGE UP (ref 0–2)
BILIRUB SERPL-MCNC: 0.2 MG/DL — SIGNIFICANT CHANGE UP (ref 0.2–1.2)
BUN SERPL-MCNC: 16 MG/DL — SIGNIFICANT CHANGE UP (ref 7–23)
CALCIUM SERPL-MCNC: 8.4 MG/DL — SIGNIFICANT CHANGE UP (ref 8.4–10.5)
CHLORIDE SERPL-SCNC: 99 MMOL/L — SIGNIFICANT CHANGE UP (ref 96–108)
CO2 SERPL-SCNC: 28 MMOL/L — SIGNIFICANT CHANGE UP (ref 22–31)
CREAT SERPL-MCNC: 0.72 MG/DL — SIGNIFICANT CHANGE UP (ref 0.5–1.3)
EOSINOPHIL # BLD AUTO: 0.2 K/UL — SIGNIFICANT CHANGE UP (ref 0–0.5)
EOSINOPHIL NFR BLD AUTO: 3.1 % — SIGNIFICANT CHANGE UP (ref 0–6)
GLUCOSE SERPL-MCNC: 128 MG/DL — HIGH (ref 70–99)
HCT VFR BLD CALC: 26.9 % — LOW (ref 39–50)
HGB BLD-MCNC: 9.3 G/DL — LOW (ref 13–17)
INR BLD: 1.17 RATIO — HIGH (ref 0.88–1.16)
LYMPHOCYTES # BLD AUTO: 1.7 K/UL — SIGNIFICANT CHANGE UP (ref 1–3.3)
LYMPHOCYTES # BLD AUTO: 21.7 % — SIGNIFICANT CHANGE UP (ref 13–44)
MCHC RBC-ENTMCNC: 32.1 PG — SIGNIFICANT CHANGE UP (ref 27–34)
MCHC RBC-ENTMCNC: 34.5 GM/DL — SIGNIFICANT CHANGE UP (ref 32–36)
MCV RBC AUTO: 93.1 FL — SIGNIFICANT CHANGE UP (ref 80–100)
MONOCYTES # BLD AUTO: 0.7 K/UL — SIGNIFICANT CHANGE UP (ref 0–0.9)
MONOCYTES NFR BLD AUTO: 9 % — SIGNIFICANT CHANGE UP (ref 2–14)
NEUTROPHILS # BLD AUTO: 5.2 K/UL — SIGNIFICANT CHANGE UP (ref 1.8–7.4)
NEUTROPHILS NFR BLD AUTO: 65.6 % — SIGNIFICANT CHANGE UP (ref 43–77)
PLATELET # BLD AUTO: 306 K/UL — SIGNIFICANT CHANGE UP (ref 150–400)
POTASSIUM SERPL-MCNC: 4.2 MMOL/L — SIGNIFICANT CHANGE UP (ref 3.5–5.3)
POTASSIUM SERPL-SCNC: 4.2 MMOL/L — SIGNIFICANT CHANGE UP (ref 3.5–5.3)
PROT SERPL-MCNC: 6.6 G/DL — SIGNIFICANT CHANGE UP (ref 6–8.3)
PROTHROM AB SERPL-ACNC: 12.7 SEC — SIGNIFICANT CHANGE UP (ref 9.8–12.7)
RBC # BLD: 2.89 M/UL — LOW (ref 4.2–5.8)
RBC # FLD: 12.3 % — SIGNIFICANT CHANGE UP (ref 10.3–14.5)
SODIUM SERPL-SCNC: 138 MMOL/L — SIGNIFICANT CHANGE UP (ref 135–145)
WBC # BLD: 8 K/UL — SIGNIFICANT CHANGE UP (ref 3.8–10.5)
WBC # FLD AUTO: 8 K/UL — SIGNIFICANT CHANGE UP (ref 3.8–10.5)

## 2018-02-23 PROCEDURE — 85027 COMPLETE CBC AUTOMATED: CPT

## 2018-02-23 PROCEDURE — 80053 COMPREHEN METABOLIC PANEL: CPT

## 2018-02-23 PROCEDURE — 99284 EMERGENCY DEPT VISIT MOD MDM: CPT | Mod: 25

## 2018-02-23 PROCEDURE — 85730 THROMBOPLASTIN TIME PARTIAL: CPT

## 2018-02-23 PROCEDURE — 85610 PROTHROMBIN TIME: CPT

## 2018-02-23 PROCEDURE — 99284 EMERGENCY DEPT VISIT MOD MDM: CPT | Mod: GC

## 2018-02-23 PROCEDURE — 54235 NJX CORPORA CAVERNOSA RX AGT: CPT

## 2018-02-23 RX ORDER — PHENYLEPHRINE HYDROCHLORIDE 10 MG/ML
1 INJECTION INTRAVENOUS ONCE
Qty: 0 | Refills: 0 | Status: DISCONTINUED | OUTPATIENT
Start: 2018-02-23 | End: 2018-02-23

## 2018-02-23 RX ORDER — PHENYLEPHRINE HYDROCHLORIDE 10 MG/ML
0.1 INJECTION INTRAVENOUS
Qty: 0 | Refills: 0 | Status: DISCONTINUED | OUTPATIENT
Start: 2018-02-23 | End: 2018-02-27

## 2018-02-23 NOTE — ED PROVIDER NOTE - PROGRESS NOTE DETAILS
Attending MD Carrillo: sp aspiration and phenyl injection at bedside by urology, detumescence achieved. Will observe closely for recurrence. rpt cbc to ensure cbc not trending down given recent trend upon chart review Patient reports improvement in symptoms. Agrees with discharge and outpatient follow up with strict return precautions. [employer]:  Pt continues to have no return of priapism.  Feels well.  Comfortbale with discharge and outpt urology follow-up.  Stable for discharge, return to ED for any new/wrosening sxs or return of priapism, inability tot urinate.

## 2018-02-23 NOTE — ED ADULT NURSE NOTE - OBJECTIVE STATEMENT
1215 67 yr old WM brought to ER via ambulance on stretcher from [facility] for further eval and tx of penile pain and Priapism since last night after talley insertion. Hx of surgery last wk for right femur fx. Incision intact. wearing diaper. A&Ox3. s/o CVA 6 yrs ago with residual right arm and leg weakness. A&Ox3. Color pink skin W&D. Talley intact draining yellowish urine with priapism noted. No discoloration of penis. talley was removed 2 days ago, reinserted yesterday because pt unable to void on own

## 2018-02-23 NOTE — CONSULT NOTE ADULT - ASSESSMENT
67 year old male p/w priapism likely related to trazodone, now resolved s/p bedside aspiration and irrigation of priapism with phenylephrine injections    - Observe for one hour for recurrence of erection  - Recommend repeat CBC  - Please stop trazodone  - Follow up with Dr. Elena next week [phone]

## 2018-02-23 NOTE — ED ADULT NURSE NOTE - ED STAT RN HANDOFF DETAILS
hand off given to oncoming RN's Jelani Hays and Arden Sandhu. Priapism resolved after treated by urology team. Pt feels better

## 2018-02-23 NOTE — ED ADULT NURSE REASSESSMENT NOTE - NS ED NURSE REASSESS COMMENT FT1
1240 Pt eval by Ortho/Spine . Collar removed by  1250 Pt taken back to xray for additional films. A&Ox3. Requests meal. NPO for now per  except a few ice chips

## 2018-02-23 NOTE — ED PROVIDER NOTE - ATTENDING CONTRIBUTION TO CARE
Attending MD Carrillo:  I personally have seen and examined this patient.  Resident note reviewed and agree on plan of care and except where noted.  See HPI, PE, and MDM for details.

## 2018-02-23 NOTE — ED PROVIDER NOTE - PLAN OF CARE
You were seen in the Emergency Department for priapism. It was drained. Please follow up with your regular physician this week for reevaluation. Please follow up with urology as discussed. Please return to the Emergency Department if you have any new concerning symptoms such as severe pain, weakness, or any other concerning symptoms.

## 2018-02-23 NOTE — CONSULT NOTE ADULT - SUBJECTIVE AND OBJECTIVE BOX
Urology PA Consult Note    HPI:  67 year old male with PMH CVA with right hemiparesis, right femoral fracture s/p repair (2/15) presents c/o penile pain since last night. Patient discharged to rehab with the talley for urinary retention while in the hospital, draining clear yellow urine. Last night, patient took oxycodone 5mg with no relieve. Takes trazodone for sleep. Patient states having erection since 8:30pm last night. Urology consulted for priapism. No fevers, chills, nausea, vomiting, SOB, chest pain    PAST MEDICAL & SURGICAL HISTORY:  Weakness due to acute cerebrovascular accident (CVA): right side  CVA (cerebral vascular accident)  No significant past surgical history      MEDICATIONS  (PRN):  phenylephrine   1 mG/10 mL NaCl 0.9% Injectable 0.1 milliGRAM(s) IV Push every 5 minutes PRN Priapism    FAMILY HISTORY:  No pertinent family history in first degree relatives    Allergies    No Known Allergies    Intolerances      SOCIAL HISTORY:   ETOH: denies  Tobacco: Former smoker  Drugs: denies    REVIEW OF SYSTEMS: Pertinent positives and negatives as stated in HPI, otherwise negative    VITAL SIGNS  T(C): 36.8 (02-23-18 @ 12:40), Max: 36.8 (02-23-18 @ 12:24)  HR: 70 (02-23-18 @ 12:40)  BP: 115/74 (02-23-18 @ 12:40)  SpO2: 100% (02-23-18 @ 12:40)  Wt(kg): --    PHYSICAL EXAM  Gen: NAD  Pulm: Nonlabored breathing  CV: RRR  Abd: Soft, NT/ND  : Uncircumcised/Circumcised, no lesions.  No discharge or blood at urethral meatus.  Testes descended bilaterally.  Testes and epididymis nontender bilaterally.  Cremasteric reflex present bilaterally.  Ext: No edema present b/l    LABS:                    Cultures:     RADIOLOGY: Urology PA Consult Note    HPI:  67 year old male with PMH CVA with right hemiparesis, right femoral fracture s/p repair (2/15) presents c/o penile pain since last night. Patient discharged to rehab with the talley for urinary retention while in the hospital, draining clear yellow urine. Last night, patient took oxycodone 5mg with no relieve. Takes trazodone for sleep. Patient states having erection since 8:30pm last night. Urology consulted for priapism. No fevers, chills, nausea, vomiting, SOB, chest pain    PAST MEDICAL & SURGICAL HISTORY:  Weakness due to acute cerebrovascular accident (CVA): right side  CVA (cerebral vascular accident)  No significant past surgical history      MEDICATIONS  (PRN):  phenylephrine   1 mG/10 mL NaCl 0.9% Injectable 0.1 milliGRAM(s) IV Push every 5 minutes PRN Priapism    FAMILY HISTORY:  No pertinent family history in first degree relatives    Allergies: No Known Allergies    SOCIAL HISTORY:   ETOH: denies  Tobacco: Former smoker  Drugs: denies    REVIEW OF SYSTEMS: Pertinent positives and negatives as stated in HPI, otherwise negative    VITAL SIGNS  T(C): 36.8 (02-23-18 @ 12:40), Max: 36.8 (02-23-18 @ 12:24)  HR: 70 (02-23-18 @ 12:40)  BP: 115/74 (02-23-18 @ 12:40)  SpO2: 100% (02-23-18 @ 12:40)  Wt(kg): --    PHYSICAL EXAM  Gen: NAD  Pulm: Nonlabored breathing  CV: RRR  Abd: Soft, NT/ND  : Uncircumcised, no lesions. Fully erect penis, pink with cap refill at the glans, ttp.  No discharge or blood at urethral meatus.  Talley catheter in place with clear yellow urine. Testes descended bilaterally.  Testes and epididymis nontender bilaterally  Ext: R posterior/lateral surgical incision c/d/i. healing well    LABS:                        9.3    8.0   )-----------( 306      ( 23 Feb 2018 14:36 )             26.9   02-23    138  |  99  |  16  ----------------------------<  128<H>  4.2   |  28  |  0.72    Ca    8.4      23 Feb 2018 14:36    TPro  6.6  /  Alb  3.2<L>  /  TBili  0.2  /  DBili  x   /  AST  18  /  ALT  22  /  AlkPhos  68  02-23      Procedure Note     Procedure: Aspiration and irrigation of priapism, phenylephrine injections    Explained risks and benefits. Consent obtained from patient. Patient connected to cardiac monitor. Patient prepped with betadine and draped with sterile technique. Patient anesthestized with penile block using 1% lidocaine. 18 gauge needle inserted into right base of corpus cavernosum, approximately 15cc of blood drained. In intervals of 3-5 minutes approximately 500mcg phenylephrine inserted total with resolution of erection. Pressure held and penis wrapped with kerlix. Patient tolerated procedure well without complications

## 2018-02-23 NOTE — ED PROVIDER NOTE - MEDICAL DECISION MAKING DETAILS
Attending MD Carrillo: 67M with priapism, has indwelling talley given urinary retention in setting of recent hip surgery. Plan for urology consult for definitive management of priapism

## 2018-02-23 NOTE — ED PROVIDER NOTE - OBJECTIVE STATEMENT
66yo male p/w priapism. Pt. has history of CVA 6-7 years ago, femoral fracture s/p repair on Thursday, Dr. Rojas. Pt. with penile pain since last night while at rehab facility. Pt. was given oxycodone last night 5mg. Pt. reports priapism since last night 830pm. Denies fevers, cp, sob, vomiting. Torres was left in for urinary retention. Pt. failed voiding trial Monday.

## 2018-04-14 ENCOUNTER — INPATIENT (INPATIENT)
Facility: HOSPITAL | Age: 68
LOS: 1 days | Discharge: HOME | DRG: 556 | End: 2018-04-16
Attending: HOSPITALIST | Admitting: HOSPITALIST
Payer: MEDICARE

## 2018-04-14 VITALS
DIASTOLIC BLOOD PRESSURE: 78 MMHG | RESPIRATION RATE: 16 BRPM | TEMPERATURE: 98 F | HEART RATE: 61 BPM | OXYGEN SATURATION: 99 % | SYSTOLIC BLOOD PRESSURE: 124 MMHG

## 2018-04-14 LAB
APTT BLD: 30 SEC — SIGNIFICANT CHANGE UP (ref 27.5–37.4)
BASOPHILS # BLD AUTO: 0 K/UL — SIGNIFICANT CHANGE UP (ref 0–0.2)
BASOPHILS NFR BLD AUTO: 0.5 % — SIGNIFICANT CHANGE UP (ref 0–2)
EOSINOPHIL # BLD AUTO: 0.1 K/UL — SIGNIFICANT CHANGE UP (ref 0–0.5)
EOSINOPHIL NFR BLD AUTO: 2.7 % — SIGNIFICANT CHANGE UP (ref 0–6)
HCT VFR BLD CALC: 37.3 % — LOW (ref 39–50)
HGB BLD-MCNC: 12 G/DL — LOW (ref 13–17)
INR BLD: 1.06 RATIO — SIGNIFICANT CHANGE UP (ref 0.88–1.16)
LYMPHOCYTES # BLD AUTO: 2.3 K/UL — SIGNIFICANT CHANGE UP (ref 1–3.3)
LYMPHOCYTES # BLD AUTO: 41.4 % — SIGNIFICANT CHANGE UP (ref 13–44)
MCHC RBC-ENTMCNC: 29.4 PG — SIGNIFICANT CHANGE UP (ref 27–34)
MCHC RBC-ENTMCNC: 32.1 GM/DL — SIGNIFICANT CHANGE UP (ref 32–36)
MCV RBC AUTO: 91.6 FL — SIGNIFICANT CHANGE UP (ref 80–100)
MONOCYTES # BLD AUTO: 0.4 K/UL — SIGNIFICANT CHANGE UP (ref 0–0.9)
MONOCYTES NFR BLD AUTO: 7.4 % — SIGNIFICANT CHANGE UP (ref 2–14)
NEUTROPHILS # BLD AUTO: 2.6 K/UL — SIGNIFICANT CHANGE UP (ref 1.8–7.4)
NEUTROPHILS NFR BLD AUTO: 48 % — SIGNIFICANT CHANGE UP (ref 43–77)
PLATELET # BLD AUTO: 154 K/UL — SIGNIFICANT CHANGE UP (ref 150–400)
PROTHROM AB SERPL-ACNC: 11.6 SEC — SIGNIFICANT CHANGE UP (ref 9.8–12.7)
RBC # BLD: 4.07 M/UL — LOW (ref 4.2–5.8)
RBC # FLD: 13 % — SIGNIFICANT CHANGE UP (ref 10.3–14.5)
WBC # BLD: 5.5 K/UL — SIGNIFICANT CHANGE UP (ref 3.8–10.5)
WBC # FLD AUTO: 5.5 K/UL — SIGNIFICANT CHANGE UP (ref 3.8–10.5)

## 2018-04-14 PROCEDURE — 73502 X-RAY EXAM HIP UNI 2-3 VIEWS: CPT | Mod: 26,RT

## 2018-04-14 PROCEDURE — 99285 EMERGENCY DEPT VISIT HI MDM: CPT | Mod: 25

## 2018-04-14 PROCEDURE — 76377 3D RENDER W/INTRP POSTPROCES: CPT | Mod: 26

## 2018-04-14 PROCEDURE — 93010 ELECTROCARDIOGRAM REPORT: CPT

## 2018-04-14 PROCEDURE — 72192 CT PELVIS W/O DYE: CPT | Mod: 26

## 2018-04-14 PROCEDURE — 73552 X-RAY EXAM OF FEMUR 2/>: CPT | Mod: 26,RT

## 2018-04-14 NOTE — ED ADULT NURSE NOTE - OBJECTIVE STATEMENT
67M comes to ED c/o right hip and knee pain s/p fall 3 days ago. He had femoral neck fracture with surgery done in february. States he was told he was "off hip precautions" so he bent over and "got stuck". He fell from a kneeling position to the floor. States since the fall, he has had trouble lifting his leg with walking. He is on ASA for prior stroke. He has residual right sided foot drop and right upper extremity weakness. Denies SOB/chest pain/N/V/D/dizziness/head injury/back pain/neck pain/numbness/tingling. Family is at bedside. Will continue to monitor.

## 2018-04-14 NOTE — ED ADULT NURSE NOTE - CHPI ED SYMPTOMS NEG
no confusion/no fever/no vomiting/no numbness/no loss of consciousness/no bleeding/no abrasion/no tingling/no weakness

## 2018-04-14 NOTE — ED PROVIDER NOTE - SHIFT CHANGE DETAILS
Attending MD Adkins: 67M with R weakness from stroke, R total hip 2 mo ago ambulates with quad walker, bent over a couple of days ago, felt severe pain in R hip then unable to straighten out, pending ambulation, if able to ambulate will be discharged, if not then CT with ortho consult

## 2018-04-14 NOTE — ED PROVIDER NOTE - PLAN OF CARE
1. follow up with pmd within the next 48 hours  2. tylenol 1000mg every 6 hours or motrin 600mg every 6 hours as needed for pain   3. use cane/walker for ambulation and assistance   4. If symptoms worsen or concerning symptoms develop, return to ED

## 2018-04-14 NOTE — ED PROVIDER NOTE - MEDICAL DECISION MAKING DETAILS
Attending MD Adkins: See Attending Statement Darius GUTIERREZ -- Patient c RLE/hip pain.  S/P ROSMERY.  No clinical evidence of dislocation, unlikely periprosthetic fx as he has been ambulating though as per son has been so with great difficulty.  XR, pain control, assess gait/stability as pt may not be safe d/c.  --BMM  Attending MD Adkins: See Attending Statement

## 2018-04-14 NOTE — ED PROVIDER NOTE - PROGRESS NOTE DETAILS
pt ambulatory weight bearing as tolerated with cane Attending MD Adkins: Still awaiting CT results Attending MD Adkins: Awaiting read, rads contacted by CARLINE Melgar, attending will review CARLINE Melgar: spoke with radiology, 3rd call in regards to CT read, no prelim has been completed, awaiting preliminary read. CARLINE Melgar: spoke with orthopedics, they will contact radiology and call back Attending MD Adkins: ORtho bedside Attending MD Adkins: Ortho bedside CARLINE Melgar: ortho recommend admission d/t difficulty ambulating, PT evaluation CARLINE Melgar  Spoke with unattached hospitalist in regards to admitting pt, she requesting orthopedics writing a note on pt stating that pt is not a surgical canididate before being admitted under medicine vs orthopedics. Attending MD Adkins: Spoke with ortho resident who was in ED for other patient, reports patient will not be going to the OR.  REports will update his note shortly.  Discussed with Dr. Booth.  Will admit to her service.

## 2018-04-14 NOTE — ED PROVIDER NOTE - INTERPRETATION
sinus bradycardia/normal sinus rhythm sinus bradycardia/normal sinus rhythm, Normal axis, Normal OK interval and QRS complex. There are no acute ischemic ST or T-wave changes.

## 2018-04-14 NOTE — ED PROVIDER NOTE - OBJECTIVE STATEMENT
68 yo male with hx of CVA 6 years prior, hx of right femur fracture s/p repair in february 2018 seen for pain of right hip s/p fall on right hip and knee two days prior. He notes 66 yo male with hx of CVA 6 years ago, BP, HLD, hx of right femur fracture s/p ORIF in february 2018, seen for pain of right hip s/p fall on right hip and knee two days prior. He notes two days ago he fell to the ground when he "legs gave out from under him" when trying to stand, falling onto his right knee. He notes he was able to get up and did not have pain, was able to ambulate and so did not seek medical attention. Today he noticed pain in his right hip which worsens with range of motion to right hip and with ambulating, is relieved when laying still so he had his family bring him to the ED for evaluation. He has right sided weakness s/p CVA but does not note any new weakness, no parasthesias, numbness, tingling, loss of sensation of the extremity. 68 yo male with hx of CVA 6 years ago, BPH, HLD, hx of right femur fracture s/p ORIF in february 2018, seen for pain of right hip s/p fall on right hip and knee two days prior. He notes two days ago he fell to the ground when he "legs gave out from under him" when trying to stand, falling onto his right knee. He notes he was able to get up and did not have pain, was able to ambulate and so did not seek medical attention. Today he noticed pain in his right hip which worsens with range of motion to right hip and with ambulating, is relieved when laying still so he had his family bring him to the ED for evaluation. He has right sided weakness s/p CVA but does not note any new weakness, no parasthesias, numbness, tingling, loss of sensation of the extremity. 68 yo male with hx of CVA 6 years ago, BPH, HLD, hx of right femur fracture s/p hemiarthoplasty in february 2018, seen for pain of right hip s/p fall on right hip and knee two days prior. He notes two days ago he fell to the ground when he "legs gave out from under him" when trying to stand, falling onto his right knee. He notes he was able to get up and did not have pain, was able to ambulate and so did not seek medical attention. Today he noticed pain in his right hip which worsens with range of motion to right hip and with ambulating, is relieved when laying still so he had his family bring him to the ED for evaluation. He has right sided weakness s/p CVA but does not note any new weakness, no parasthesias, numbness, tingling, loss of sensation of the extremity.

## 2018-04-14 NOTE — ED ADULT TRIAGE NOTE - CHIEF COMPLAINT QUOTE
fall forward onto his knee on right side yesterday, femor fracture 2/13/18, right hip and knee pain, increased pain

## 2018-04-14 NOTE — ED PROVIDER NOTE - MUSCULOSKELETAL MINIMAL EXAM
RANGE OF MOTION LIMITED/Right extremity with reduced ROM s/p CVA, Right hip + anterior tenting, reduced ROM at hip, FROM at knee. +TTP of right hip at rest. Right extremity with reduced ROM s/p CVA, reduced ROM at hip, FROM at knee. +TTP of right hip at rest./RANGE OF MOTION LIMITED

## 2018-04-14 NOTE — ED PROVIDER NOTE - CARE PLAN
Principal Discharge DX:	Right hip pain  Assessment and plan of treatment:	1. follow up with pmd within the next 48 hours  2. tylenol 1000mg every 6 hours or motrin 600mg every 6 hours as needed for pain   3. use cane/walker for ambulation and assistance   4. If symptoms worsen or concerning symptoms develop, return to ED Principal Discharge DX:	Acetabulum fracture, right  Secondary Diagnosis:	Unsteady gait

## 2018-04-15 DIAGNOSIS — Z96.641 PRESENCE OF RIGHT ARTIFICIAL HIP JOINT: Chronic | ICD-10-CM

## 2018-04-15 DIAGNOSIS — S32.401A UNSPECIFIED FRACTURE OF RIGHT ACETABULUM, INITIAL ENCOUNTER FOR CLOSED FRACTURE: ICD-10-CM

## 2018-04-15 DIAGNOSIS — I10 ESSENTIAL (PRIMARY) HYPERTENSION: ICD-10-CM

## 2018-04-15 DIAGNOSIS — M25.551 PAIN IN RIGHT HIP: ICD-10-CM

## 2018-04-15 DIAGNOSIS — I63.9 CEREBRAL INFARCTION, UNSPECIFIED: ICD-10-CM

## 2018-04-15 LAB
ALBUMIN SERPL ELPH-MCNC: 3.7 G/DL — SIGNIFICANT CHANGE UP (ref 3.3–5)
ALP SERPL-CCNC: 73 U/L — SIGNIFICANT CHANGE UP (ref 40–120)
ALT FLD-CCNC: 14 U/L RC — SIGNIFICANT CHANGE UP (ref 10–45)
ANION GAP SERPL CALC-SCNC: 10 MMOL/L — SIGNIFICANT CHANGE UP (ref 5–17)
ANION GAP SERPL CALC-SCNC: 12 MMOL/L — SIGNIFICANT CHANGE UP (ref 5–17)
APPEARANCE UR: CLEAR — SIGNIFICANT CHANGE UP
AST SERPL-CCNC: 20 U/L — SIGNIFICANT CHANGE UP (ref 10–40)
BACTERIA # UR AUTO: NEGATIVE — SIGNIFICANT CHANGE UP
BASOPHILS # BLD AUTO: 0.03 K/UL — SIGNIFICANT CHANGE UP (ref 0–0.2)
BASOPHILS NFR BLD AUTO: 0.6 % — SIGNIFICANT CHANGE UP (ref 0–2)
BILIRUB SERPL-MCNC: 0.2 MG/DL — SIGNIFICANT CHANGE UP (ref 0.2–1.2)
BILIRUB UR-MCNC: NEGATIVE — SIGNIFICANT CHANGE UP
BUN SERPL-MCNC: 11 MG/DL — SIGNIFICANT CHANGE UP (ref 7–23)
BUN SERPL-MCNC: 13 MG/DL — SIGNIFICANT CHANGE UP (ref 7–23)
CALCIUM SERPL-MCNC: 8.8 MG/DL — SIGNIFICANT CHANGE UP (ref 8.4–10.5)
CALCIUM SERPL-MCNC: 9 MG/DL — SIGNIFICANT CHANGE UP (ref 8.4–10.5)
CHLORIDE SERPL-SCNC: 103 MMOL/L — SIGNIFICANT CHANGE UP (ref 96–108)
CHLORIDE SERPL-SCNC: 104 MMOL/L — SIGNIFICANT CHANGE UP (ref 96–108)
CO2 SERPL-SCNC: 26 MMOL/L — SIGNIFICANT CHANGE UP (ref 22–31)
CO2 SERPL-SCNC: 27 MMOL/L — SIGNIFICANT CHANGE UP (ref 22–31)
COLOR SPEC: YELLOW — SIGNIFICANT CHANGE UP
CREAT SERPL-MCNC: 0.69 MG/DL — SIGNIFICANT CHANGE UP (ref 0.5–1.3)
CREAT SERPL-MCNC: 0.71 MG/DL — SIGNIFICANT CHANGE UP (ref 0.5–1.3)
DIFF PNL FLD: ABNORMAL
EOSINOPHIL # BLD AUTO: 0.14 K/UL — SIGNIFICANT CHANGE UP (ref 0–0.5)
EOSINOPHIL NFR BLD AUTO: 2.6 % — SIGNIFICANT CHANGE UP (ref 0–6)
EPI CELLS # UR: 0 /HPF — SIGNIFICANT CHANGE UP (ref 0–5)
GLUCOSE SERPL-MCNC: 89 MG/DL — SIGNIFICANT CHANGE UP (ref 70–99)
GLUCOSE SERPL-MCNC: 92 MG/DL — SIGNIFICANT CHANGE UP (ref 70–99)
GLUCOSE UR QL: NEGATIVE MG/DL — SIGNIFICANT CHANGE UP
HCT VFR BLD CALC: 35.5 % — LOW (ref 39–50)
HGB BLD-MCNC: 11.7 G/DL — LOW (ref 13–17)
HYALINE CASTS # UR AUTO: 0 /LPF — SIGNIFICANT CHANGE UP (ref 0–7)
IMM GRANULOCYTES NFR BLD AUTO: 0.2 % — SIGNIFICANT CHANGE UP (ref 0–1.5)
KETONES UR-MCNC: NEGATIVE — SIGNIFICANT CHANGE UP
LEUKOCYTE ESTERASE UR-ACNC: NEGATIVE — SIGNIFICANT CHANGE UP
LYMPHOCYTES # BLD AUTO: 2.01 K/UL — SIGNIFICANT CHANGE UP (ref 1–3.3)
LYMPHOCYTES # BLD AUTO: 36.9 % — SIGNIFICANT CHANGE UP (ref 13–44)
MANUAL SMEAR VERIFICATION: SIGNIFICANT CHANGE UP
MCHC RBC-ENTMCNC: 29.2 PG — SIGNIFICANT CHANGE UP (ref 27–34)
MCHC RBC-ENTMCNC: 33 GM/DL — SIGNIFICANT CHANGE UP (ref 32–36)
MCV RBC AUTO: 88.5 FL — SIGNIFICANT CHANGE UP (ref 80–100)
MONOCYTES # BLD AUTO: 0.42 K/UL — SIGNIFICANT CHANGE UP (ref 0–0.9)
MONOCYTES NFR BLD AUTO: 7.7 % — SIGNIFICANT CHANGE UP (ref 2–14)
NEUTROPHILS # BLD AUTO: 2.84 K/UL — SIGNIFICANT CHANGE UP (ref 1.8–7.4)
NEUTROPHILS NFR BLD AUTO: 52 % — SIGNIFICANT CHANGE UP (ref 43–77)
NITRITE UR-MCNC: NEGATIVE — SIGNIFICANT CHANGE UP
PH UR: 6.5 — SIGNIFICANT CHANGE UP (ref 5–8)
PLAT MORPH BLD: NORMAL — SIGNIFICANT CHANGE UP
PLATELET # BLD AUTO: 142 K/UL — LOW (ref 150–400)
POTASSIUM SERPL-MCNC: 3.7 MMOL/L — SIGNIFICANT CHANGE UP (ref 3.5–5.3)
POTASSIUM SERPL-MCNC: 4 MMOL/L — SIGNIFICANT CHANGE UP (ref 3.5–5.3)
POTASSIUM SERPL-SCNC: 3.7 MMOL/L — SIGNIFICANT CHANGE UP (ref 3.5–5.3)
POTASSIUM SERPL-SCNC: 4 MMOL/L — SIGNIFICANT CHANGE UP (ref 3.5–5.3)
PROT SERPL-MCNC: 6.7 G/DL — SIGNIFICANT CHANGE UP (ref 6–8.3)
PROT UR-MCNC: NEGATIVE MG/DL — SIGNIFICANT CHANGE UP
RBC # BLD: 4.01 M/UL — LOW (ref 4.2–5.8)
RBC # FLD: 13.7 % — SIGNIFICANT CHANGE UP (ref 10.3–14.5)
RBC BLD AUTO: SIGNIFICANT CHANGE UP
RBC CASTS # UR COMP ASSIST: 6 /HPF — HIGH (ref 0–4)
SODIUM SERPL-SCNC: 140 MMOL/L — SIGNIFICANT CHANGE UP (ref 135–145)
SODIUM SERPL-SCNC: 142 MMOL/L — SIGNIFICANT CHANGE UP (ref 135–145)
SP GR SPEC: 1.01 — SIGNIFICANT CHANGE UP (ref 1.01–1.02)
UROBILINOGEN FLD QL: NEGATIVE MG/DL — SIGNIFICANT CHANGE UP
WBC # BLD: 5.45 K/UL — SIGNIFICANT CHANGE UP (ref 3.8–10.5)
WBC # FLD AUTO: 5.45 K/UL — SIGNIFICANT CHANGE UP (ref 3.8–10.5)
WBC UR QL: 0 /HPF — SIGNIFICANT CHANGE UP (ref 0–5)

## 2018-04-15 PROCEDURE — 71045 X-RAY EXAM CHEST 1 VIEW: CPT | Mod: 26

## 2018-04-15 PROCEDURE — 12345: CPT | Mod: NC

## 2018-04-15 PROCEDURE — 99223 1ST HOSP IP/OBS HIGH 75: CPT

## 2018-04-15 PROCEDURE — 73560 X-RAY EXAM OF KNEE 1 OR 2: CPT | Mod: 26,RT

## 2018-04-15 PROCEDURE — 70450 CT HEAD/BRAIN W/O DYE: CPT | Mod: 26

## 2018-04-15 RX ORDER — ACETAMINOPHEN 500 MG
650 TABLET ORAL EVERY 6 HOURS
Qty: 0 | Refills: 0 | Status: DISCONTINUED | OUTPATIENT
Start: 2018-04-15 | End: 2018-04-16

## 2018-04-15 RX ORDER — ASPIRIN/CALCIUM CARB/MAGNESIUM 324 MG
81 TABLET ORAL DAILY
Qty: 0 | Refills: 0 | Status: DISCONTINUED | OUTPATIENT
Start: 2018-04-15 | End: 2018-04-16

## 2018-04-15 RX ORDER — ENOXAPARIN SODIUM 100 MG/ML
40 INJECTION SUBCUTANEOUS EVERY 24 HOURS
Qty: 0 | Refills: 0 | Status: DISCONTINUED | OUTPATIENT
Start: 2018-04-15 | End: 2018-04-16

## 2018-04-15 RX ORDER — BACLOFEN 100 %
10 POWDER (GRAM) MISCELLANEOUS DAILY
Qty: 0 | Refills: 0 | Status: DISCONTINUED | OUTPATIENT
Start: 2018-04-15 | End: 2018-04-16

## 2018-04-15 RX ORDER — DOCUSATE SODIUM 100 MG
100 CAPSULE ORAL THREE TIMES A DAY
Qty: 0 | Refills: 0 | Status: DISCONTINUED | OUTPATIENT
Start: 2018-04-15 | End: 2018-04-16

## 2018-04-15 RX ORDER — CITALOPRAM 10 MG/1
20 TABLET, FILM COATED ORAL DAILY
Qty: 0 | Refills: 0 | Status: DISCONTINUED | OUTPATIENT
Start: 2018-04-15 | End: 2018-04-16

## 2018-04-15 RX ORDER — CITALOPRAM 10 MG/1
1 TABLET, FILM COATED ORAL
Qty: 0 | Refills: 0 | COMMUNITY

## 2018-04-15 RX ORDER — TRAZODONE HCL 50 MG
1 TABLET ORAL
Qty: 0 | Refills: 0 | COMMUNITY

## 2018-04-15 RX ORDER — GABAPENTIN 400 MG/1
300 CAPSULE ORAL THREE TIMES A DAY
Qty: 0 | Refills: 0 | Status: DISCONTINUED | OUTPATIENT
Start: 2018-04-15 | End: 2018-04-16

## 2018-04-15 RX ORDER — GABAPENTIN 400 MG/1
1 CAPSULE ORAL
Qty: 0 | Refills: 0 | COMMUNITY

## 2018-04-15 RX ORDER — TAMSULOSIN HYDROCHLORIDE 0.4 MG/1
0.4 CAPSULE ORAL AT BEDTIME
Qty: 0 | Refills: 0 | Status: DISCONTINUED | OUTPATIENT
Start: 2018-04-15 | End: 2018-04-16

## 2018-04-15 RX ADMIN — Medication 100 MILLIGRAM(S): at 21:14

## 2018-04-15 RX ADMIN — ENOXAPARIN SODIUM 40 MILLIGRAM(S): 100 INJECTION SUBCUTANEOUS at 05:39

## 2018-04-15 RX ADMIN — Medication 100 MILLIGRAM(S): at 05:39

## 2018-04-15 RX ADMIN — CITALOPRAM 20 MILLIGRAM(S): 10 TABLET, FILM COATED ORAL at 13:31

## 2018-04-15 RX ADMIN — Medication 40 MILLIGRAM(S): at 21:14

## 2018-04-15 RX ADMIN — Medication 10 MILLIGRAM(S): at 13:31

## 2018-04-15 RX ADMIN — Medication 81 MILLIGRAM(S): at 13:31

## 2018-04-15 RX ADMIN — TAMSULOSIN HYDROCHLORIDE 0.4 MILLIGRAM(S): 0.4 CAPSULE ORAL at 21:14

## 2018-04-15 RX ADMIN — GABAPENTIN 300 MILLIGRAM(S): 400 CAPSULE ORAL at 05:39

## 2018-04-15 RX ADMIN — GABAPENTIN 300 MILLIGRAM(S): 400 CAPSULE ORAL at 21:14

## 2018-04-15 RX ADMIN — GABAPENTIN 300 MILLIGRAM(S): 400 CAPSULE ORAL at 13:31

## 2018-04-15 RX ADMIN — Medication 100 MILLIGRAM(S): at 13:31

## 2018-04-15 NOTE — H&P ADULT - PROBLEM SELECTOR PLAN 1
See above.  Await full orthopaedic attending recommendations.  Non weight bearing for now.  Will obtain RIGHT knee radiograph.

## 2018-04-15 NOTE — H&P ADULT - ATTENDING COMMENTS
NIGHT HOSPITALIST:  Patient/ son aware of course and agree with plan/care as above.  Care reviewed with covering NP.  Care assumed by the DAY HOSPITALIST.

## 2018-04-15 NOTE — H&P ADULT - ASSESSMENT
NIGHT HOSPITALIST:  Presentation S/P fall 2 days ago with RIGHT hip  and knee pain S/P presumed mechanical fall with recent DC from [facility] with nondiagnostic CTT pelvis>>await full recommendations by the orthopaedic attending.  Will have non weight bearing status for RIGHT LE.  Will also assume aspiration risk for now.  RIGHT knee radiograph and chest radiograph ordered.  Unclear to the chronic status of patient's cerebrovascular disease, may consider initial non contrast head CTT.

## 2018-04-15 NOTE — H&P ADULT - HISTORY OF PRESENT ILLNESS
NIGHT HOSPITALIST:  Patient UNKNOWN to  me previously, assigned to me at this point via the ER to admit this 66 y/o M--patient primary language is Tajik but patient declined telephone  phone with history and medication list reviewed with son via telephone--patient with a history of RIGHT sided CVA 6 years previously with RIGHT sided paresis, reported BPH, history S/P RIGHT femur fx S/P repair in 2/2018, with the patient apparently falling to the ground 2 days ago with his legs giving out with patient noting RIGHT hip pain with worsening with weight bearing and initial RIGHT knee pain when patient fell.  Patient presently denies RIGHT hip or knee pain.  No LOC.  NO HA<no new focal weakness.  No chest pain/pressure.  No dyspnea.  No abdominal pain, no red blood per rectum.  No back pain, no tearing back pain.  No dysuria.  Remaining review of systems not contributory.

## 2018-04-15 NOTE — CHART NOTE - NSCHARTNOTEFT_GEN_A_CORE
Briefly, this is a 66 yo male with hx of CVA 6 years prior, BPH, HLD, hx of right femur fracture s/p hemiarthoplasty who presented with right hip and knee pain s/p fall. Fall likely mechanical in nature given history. Underwent CT imaging of the hip and pelvis revealed no fracture or dislocation but did show a nonspecific fluid collection in the right gluteus stephan muscle along the posterior aspect of the right greater trochanter, which likely communicates with the right hip joint space through a defect in the posterior pseudocapsule. Seen by ortho, no acute orthopedic surgical intervention required at this time. CT head negative for acute stroke. X ray knee negative for fracture.   - PT consult pending, WBAT given imaging negative for fracture   - Pain control

## 2018-04-15 NOTE — H&P ADULT - NSHPPHYSICALEXAM_GEN_ALL_CORE
Physical exam with a middle aged, chronically ill appearing M.  Afebrile.  BP  137/69  HR  56.   RR 14.  99% on RA.  HEENT< PERRL< EOMI, neck supple, chest clear, cor s1 s2, abdomen soft normal bowel sounds, nontender, no rebound, Skin clear.  Neurologic exam AxOx3.  speech grossly fluent.  Limited cognitive assessment due to language barrier.  RIGHT UE with mild flexion contracture with 2/5  strength (chronic per son) with 2/5 RIGHT LE flexion strength.  No RIGHT hip tenderness with passive or active motion.  Interossei muscle wasting RIGHT hand. Physical exam with a middle aged, chronically ill appearing M.  Afebrile.  BP  137/69  HR  56.   RR 14.  99% on RA.  HEENT< PERRL< EOMI, neck supple, chest clear, cor s1 s2, abdomen soft normal bowel sounds, nontender, no rebound, Skin clear.  Neurologic exam AxOx3.  speech grossly fluent.  Limited cognitive assessment due to language barrier.  RIGHT UE with mild flexion contracture with 2/5  strength (chronic per son) with 2/5 RIGHT LE flexion strength.  No RIGHT hip tenderness with passive or active motion.  Interossei muscle wasting RIGHT hand.  RIGHT knee with no effusion and full range of motion.

## 2018-04-15 NOTE — CONSULT NOTE ADULT - SUBJECTIVE AND OBJECTIVE BOX
67y Male presents c/o R hip pain and difficulty ambulating sp mechanical fall. Patient speaks primarily Canadian, insists on communicating through son as  over the phone. The patient son He notes that two days ago he fell to the ground when he "legs gave out from under him" when trying to stand, falling onto his right knee. The patient was able to get up and did not have pain, was able to ambulate and so did not seek medical attention. Today he the patient noticed pain in his right hip which worsens with range of motion to right hip. Per the patient's son, his father gait has changed and he has been "dragging" his right leg. Denies HS/LOC. Denies numbness/tingling. Denies fever/chills. Denies pain/injury elsewhere. The patient has a hx of R femoral neck fracture s/p R hip hemiarthroplasty by Dr. Rojas in February 2018. Patient also has a hx of CVA 6 years ago with residual right sided weakness. Patient RUE currently in [facility] and RLE in [facility].    HEALTH ISSUES - PROBLEM Dx: CVA w R sided weakness, BPH, HLD, R fem neck fx s/p merlene (Bob 2/14/18)        MEDICATIONS  (STANDING):    Allergies    trazodone (Unknown)    Intolerances                              12.0   5.5   )-----------( 154      ( 14 Apr 2018 23:42 )             37.3     14 Apr 2018 23:42    142    |  103    |  13     ----------------------------<  89     3.7     |  27     |  0.71     Ca    9.0        14 Apr 2018 23:42    TPro  6.7    /  Alb  3.7    /  TBili  0.2    /  DBili  x      /  AST  20     /  ALT  14     /  AlkPhos  73     14 Apr 2018 23:42    PT/INR - ( 14 Apr 2018 23:42 )   PT: 11.6 sec;   INR: 1.06 ratio         PTT - ( 14 Apr 2018 23:42 )  PTT:30.0 sec  Vital Signs Last 24 Hrs  T(C): 36.7 (04-14-18 @ 13:51), Max: 36.7 (04-14-18 @ 13:51)  T(F): 98.1 (04-14-18 @ 13:51), Max: 98.1 (04-14-18 @ 13:51)  HR: 61 (04-14-18 @ 13:51) (61 - 61)  BP: 124/78 (04-14-18 @ 13:51) (124/78 - 124/78)  BP(mean): --  RR: 16 (04-14-18 @ 13:51) (16 - 16)  SpO2: 99% (04-14-18 @ 13:51) (99% - 99%)    Imaging: XR Pel/R hip: s/p R hip merlene, no obvious fractures, possible disruption of iliopectineal line  CT Pel (official read pending): possible non displaced anterior column acetabulum fracture    Physical Exam  Gen: NAD  RLE: skin intact, able to SLR RLE, neg log roll RLE, + mild hip/groin pain with ROM, +mild ttp hip/groin, no ttp elsewhere, +ehl/fhl/ta/gs function, no calf ttp, dp/pt pulse intact, compartments soft  Secondary survey: benign, nv intact, able to SLR contralateral leg, negative log roll contralateral leg, no bony ttp elsewhere    A/P: 67y Male with R hip pain s/p R hip hemiarthroplasty  Possible non displaced anterior column fracture  FU official read of CT pelvis  Pain control  If anterior column fracture confirmed on CT, then TTWB RLE with assistive devices as needed  If CT negative for fracture, the WBAT  FU labs/imaging  Ca/Vit D  Outpt osteoporosis workup  If patient requires RLE restricted WB, may need admission for rehab placement as it is unlikely the patient would be able to comply due to his RUE weakness  Further recs pending CT results  Regardless of CT results, no acute orthopedic surgical intervention  Case discussed with Dr. Rojas

## 2018-04-15 NOTE — H&P ADULT - PROBLEM SELECTOR PLAN 2
Prior CVA as above.  Will obtain routine noncontrast head CTT.  Aspiration precautions.  Chest radiograph.

## 2018-04-15 NOTE — H&P ADULT - NSHPLABSRESULTS_GEN_ALL_CORE
WBC 5.5.  Hgb 12.0  Platelets of 154K.  INR 1.0.  K+ 3.7.  Random glucose of 89.   Cr 0.7  EKG tracing reviewed with sinus bradycardia at 53.  RIGHT femur/hip radiograph nondiagnostic.  Bony pelvis CTT no gross fracture.

## 2018-04-16 ENCOUNTER — TRANSCRIPTION ENCOUNTER (OUTPATIENT)
Age: 68
End: 2018-04-16

## 2018-04-16 VITALS
SYSTOLIC BLOOD PRESSURE: 129 MMHG | DIASTOLIC BLOOD PRESSURE: 77 MMHG | OXYGEN SATURATION: 98 % | TEMPERATURE: 99 F | HEART RATE: 65 BPM | RESPIRATION RATE: 18 BRPM

## 2018-04-16 LAB
ANION GAP SERPL CALC-SCNC: 13 MMOL/L — SIGNIFICANT CHANGE UP (ref 5–17)
BUN SERPL-MCNC: 10 MG/DL — SIGNIFICANT CHANGE UP (ref 7–23)
CALCIUM SERPL-MCNC: 9 MG/DL — SIGNIFICANT CHANGE UP (ref 8.4–10.5)
CHLORIDE SERPL-SCNC: 101 MMOL/L — SIGNIFICANT CHANGE UP (ref 96–108)
CO2 SERPL-SCNC: 25 MMOL/L — SIGNIFICANT CHANGE UP (ref 22–31)
CREAT SERPL-MCNC: 0.73 MG/DL — SIGNIFICANT CHANGE UP (ref 0.5–1.3)
GLUCOSE SERPL-MCNC: 92 MG/DL — SIGNIFICANT CHANGE UP (ref 70–99)
HCT VFR BLD CALC: 37.7 % — LOW (ref 39–50)
HGB BLD-MCNC: 12.4 G/DL — LOW (ref 13–17)
MCHC RBC-ENTMCNC: 30 PG — SIGNIFICANT CHANGE UP (ref 27–34)
MCHC RBC-ENTMCNC: 32.9 GM/DL — SIGNIFICANT CHANGE UP (ref 32–36)
MCV RBC AUTO: 91.1 FL — SIGNIFICANT CHANGE UP (ref 80–100)
PLATELET # BLD AUTO: 156 K/UL — SIGNIFICANT CHANGE UP (ref 150–400)
POTASSIUM SERPL-MCNC: 4.2 MMOL/L — SIGNIFICANT CHANGE UP (ref 3.5–5.3)
POTASSIUM SERPL-SCNC: 4.2 MMOL/L — SIGNIFICANT CHANGE UP (ref 3.5–5.3)
RBC # BLD: 4.14 M/UL — LOW (ref 4.2–5.8)
RBC # FLD: 13.6 % — SIGNIFICANT CHANGE UP (ref 10.3–14.5)
SODIUM SERPL-SCNC: 139 MMOL/L — SIGNIFICANT CHANGE UP (ref 135–145)
WBC # BLD: 6.17 K/UL — SIGNIFICANT CHANGE UP (ref 3.8–10.5)
WBC # FLD AUTO: 6.17 K/UL — SIGNIFICANT CHANGE UP (ref 3.8–10.5)

## 2018-04-16 PROCEDURE — 93005 ELECTROCARDIOGRAM TRACING: CPT

## 2018-04-16 PROCEDURE — 70450 CT HEAD/BRAIN W/O DYE: CPT

## 2018-04-16 PROCEDURE — 72192 CT PELVIS W/O DYE: CPT

## 2018-04-16 PROCEDURE — 73560 X-RAY EXAM OF KNEE 1 OR 2: CPT

## 2018-04-16 PROCEDURE — 80053 COMPREHEN METABOLIC PANEL: CPT

## 2018-04-16 PROCEDURE — 73552 X-RAY EXAM OF FEMUR 2/>: CPT

## 2018-04-16 PROCEDURE — 80048 BASIC METABOLIC PNL TOTAL CA: CPT

## 2018-04-16 PROCEDURE — 85610 PROTHROMBIN TIME: CPT

## 2018-04-16 PROCEDURE — 85730 THROMBOPLASTIN TIME PARTIAL: CPT

## 2018-04-16 PROCEDURE — 76377 3D RENDER W/INTRP POSTPROCES: CPT

## 2018-04-16 PROCEDURE — 81001 URINALYSIS AUTO W/SCOPE: CPT

## 2018-04-16 PROCEDURE — 99239 HOSP IP/OBS DSCHRG MGMT >30: CPT

## 2018-04-16 PROCEDURE — 97162 PT EVAL MOD COMPLEX 30 MIN: CPT

## 2018-04-16 PROCEDURE — 99285 EMERGENCY DEPT VISIT HI MDM: CPT

## 2018-04-16 PROCEDURE — 73502 X-RAY EXAM HIP UNI 2-3 VIEWS: CPT

## 2018-04-16 PROCEDURE — 85027 COMPLETE CBC AUTOMATED: CPT

## 2018-04-16 PROCEDURE — 71045 X-RAY EXAM CHEST 1 VIEW: CPT

## 2018-04-16 RX ORDER — ASPIRIN/CALCIUM CARB/MAGNESIUM 324 MG
1 TABLET ORAL
Qty: 0 | Refills: 0 | COMMUNITY

## 2018-04-16 RX ORDER — ASPIRIN/CALCIUM CARB/MAGNESIUM 324 MG
1 TABLET ORAL
Qty: 0 | Refills: 0 | COMMUNITY
Start: 2018-04-16

## 2018-04-16 RX ORDER — BACLOFEN 100 %
1 POWDER (GRAM) MISCELLANEOUS
Qty: 0 | Refills: 0 | COMMUNITY

## 2018-04-16 RX ORDER — DOCUSATE SODIUM 100 MG
1 CAPSULE ORAL
Qty: 0 | Refills: 0 | COMMUNITY
Start: 2018-04-16

## 2018-04-16 RX ORDER — BACLOFEN 100 %
1 POWDER (GRAM) MISCELLANEOUS
Qty: 0 | Refills: 0 | COMMUNITY
Start: 2018-04-16

## 2018-04-16 RX ADMIN — GABAPENTIN 300 MILLIGRAM(S): 400 CAPSULE ORAL at 12:58

## 2018-04-16 RX ADMIN — Medication 81 MILLIGRAM(S): at 12:58

## 2018-04-16 RX ADMIN — ENOXAPARIN SODIUM 40 MILLIGRAM(S): 100 INJECTION SUBCUTANEOUS at 05:47

## 2018-04-16 RX ADMIN — Medication 10 MILLIGRAM(S): at 12:58

## 2018-04-16 RX ADMIN — CITALOPRAM 20 MILLIGRAM(S): 10 TABLET, FILM COATED ORAL at 12:58

## 2018-04-16 RX ADMIN — Medication 100 MILLIGRAM(S): at 12:58

## 2018-04-16 RX ADMIN — GABAPENTIN 300 MILLIGRAM(S): 400 CAPSULE ORAL at 05:47

## 2018-04-16 RX ADMIN — Medication 100 MILLIGRAM(S): at 05:47

## 2018-04-16 NOTE — DISCHARGE NOTE ADULT - CARE PROVIDER_API CALL
Gloria Rojas (MD), Orthopaedic Surgery  [address] [place] [id number]  Phone: [phone]  Fax: [phone]

## 2018-04-16 NOTE — CHART NOTE - NSCHARTNOTEFT_GEN_A_CORE
CT read reviewed with Dr. Rojas.    A/P: 67y Male with R hip pain s/p R hip hemiarthroplasty  Pain control  WBAT with CANE, patient should use cane whenever ambulating  PT  No acute orthopedic surgical intervention  Ortho stable

## 2018-04-16 NOTE — PHYSICAL THERAPY INITIAL EVALUATION ADULT - PLANNED THERAPY INTERVENTIONS, PT EVAL
bed mobility training/balance training/transfer training/GOAL: Pt will negotiate 12 stairs with 1 HR and step to pattern with independence in 2 weeks./gait training/strengthening

## 2018-04-16 NOTE — DISCHARGE NOTE ADULT - MEDICATION SUMMARY - MEDICATIONS TO TAKE
I will START or STAY ON the medications listed below when I get home from the hospital:    rolling Walker   -- Indication: For To help walk    aspirin 81 mg oral delayed release tablet  -- 1 tab(s) by mouth once a day  -- Indication: For Antiplatlet    acetaminophen 325 mg oral tablet  -- 2 tab(s) by mouth every 6 hours, As needed, Mild Pain (1 - 3)  -- Indication: For pain    tamsulosin 0.4 mg oral capsule  -- 1 cap(s) by mouth once a day (at bedtime)  -- Indication: For bph    gabapentin 300 mg oral tablet  -- 1 tab(s) by mouth 3 times a day  -- Indication: For Anticonvulsants    CeleXA 20 mg oral tablet  -- 1 tab(s) by mouth once a day  -- Indication: For depression    pravastatin 40 mg oral tablet  -- 1 tab(s) by mouth once a day (at bedtime)  -- Indication: For Hld    docusate sodium 100 mg oral capsule  -- 1 cap(s) by mouth 3 times a day  -- Indication: For stool softners    baclofen 10 mg oral tablet  -- 1 tab(s) by mouth once a day  -- Indication: For muscle relaxant

## 2018-04-16 NOTE — DISCHARGE NOTE ADULT - HOSPITAL COURSE
67 yr old past medical history of CVA , BPH  and R femur repair admitted s/p fall , with R hip and knee pian .  Negative CT brain . CT Pelvis negative for  Fracture . seen by Orthopedics no inpatient work up . Pt able to ambulate with a walker being discharged home with Home PT with close follow up form PCP . 67 yr old past medical history of CVA , BPH  and R femur repair admitted s/p fall , with R hip and knee pian .  Negative CT brain . CT Pelvis negative for Fracture but did show nonspecific fluid collection in r gluteus stephan muscle. seen by Orthopedics no inpatient work up and recommend for outpatient follow up. Pt able to ambulate with a walker being discharged home with Home PT with close follow up form PCP. Pain well controlled with just tylenol. Discussed with patient and his son, aware of CT finding of fluid collection and to follow up ortho within 2 weeks and consider MRI at that time. Rolling walker script given. Stable for d/c home

## 2018-04-16 NOTE — PHYSICAL THERAPY INITIAL EVALUATION ADULT - IMPAIRMENTS CONTRIBUTING TO GAIT DEVIATIONS, PT EVAL
impaired postural control/impaired coordination/impaired motor control/decreased strength/impaired balance/decreased ROM

## 2018-04-16 NOTE — PHYSICAL THERAPY INITIAL EVALUATION ADULT - ACTIVE RANGE OF MOTION EXAMINATION, REHAB EVAL
bilateral  lower extremity Active ROM was WFL (within functional limits)/deficits as listed below/R shoulder flex ~50 deg., R fingers lacking at least 45 deg. extension/Left UE Active ROM was WFL (within functional limits)

## 2018-04-16 NOTE — PHYSICAL THERAPY INITIAL EVALUATION ADULT - PERTINENT HX OF CURRENT PROBLEM, REHAB EVAL
67y Male presents c/o R hip pain and difficulty ambulating sp mechanical fall. Patient speaks primarily Bahamian, insists on communicating through son as  over the phone. The patient son He notes that two days ago he fell to the ground when he "legs gave out from under him" when trying to stand, falling onto his right knee. 67y Male with h/o CVA (6 yrs ago, residual R-sided weakness) presents c/o R hip pain and difficulty ambulating sp mechanical fall. Patient speaks primarily Mohawk, insists on communicating through son as  over the phone. The patient son He notes that two days ago he fell to the ground when he "legs gave out from under him" when trying to stand, falling onto his right knee. Pt s/p R hip hemiarthroplasty on 2/14 (previous hospital admission).

## 2018-04-16 NOTE — DISCHARGE NOTE ADULT - PATIENT PORTAL LINK FT
You can access the [facility] Patient Portal, offered by [facility], by registering with the following website: [email or web address]

## 2018-04-16 NOTE — PHYSICAL THERAPY INITIAL EVALUATION ADULT - BALANCE DISTURBANCE, IDENTIFIED IMPAIRMENT CONTRIBUTE, REHAB EVAL
impaired coordination/impaired motor control/decreased ROM/impaired postural control/decreased strength

## 2018-04-16 NOTE — CHART NOTE - NSCHARTNOTEFT_GEN_A_CORE
Patient seen and examined, pain well controlled, ambulated w/ Physical therapy today.  Labs and imaging reviewed. VSS.  Ortho note reviewed.  - d/w son Dr Leandro Carbajal aware of CT finding of nonspecific fluid collection and advised to f/u with ortho as outpatient within 2 weeks  - will prescribe rolling walker script  - discharge today. DISCHARGE TIME 60 MINUTES.  - D/W NP Hira

## 2018-04-16 NOTE — PHYSICAL THERAPY INITIAL EVALUATION ADULT - STRENGTHENING, PT EVAL
GOAL: Pt will improve R UE/LE strength by 1/2 grade to improve level of independence with mobility skills and ADLs in 2 weeks.

## 2018-04-16 NOTE — DISCHARGE NOTE ADULT - CARE PLAN
Principal Discharge DX:	Fall  Goal:	no FX  Assessment and plan of treatment:	Ct scan R hip negative for fx  CT brain no acute events  Secondary Diagnosis:	Essential hypertension  Assessment and plan of treatment:	Follow up with your medical doctor to establish long term blood pressure treatment goals.  Secondary Diagnosis:	Right hip pain  Assessment and plan of treatment:	C/W Tylenol  Home PT Principal Discharge DX:	Fall  Goal:	no FX  Assessment and plan of treatment:	Ct scan R hip negative for fx  CT brain no acute events  Your CT showed a collection in the r gluteus stephan muscle. Please follow up with your orthopedic surgeon to see if you need an MRI in the future.  Secondary Diagnosis:	Essential hypertension  Assessment and plan of treatment:	Follow up with your medical doctor to establish long term blood pressure treatment goals.  Secondary Diagnosis:	Right hip pain  Assessment and plan of treatment:	C/W Tylenol  Home PT

## 2018-04-16 NOTE — PHYSICAL THERAPY INITIAL EVALUATION ADULT - ADDITIONAL COMMENTS
Pt lives with spouse and 3 children in private home with 3 stairs to enter (1 HR), and 8 stairs to upstairs bedroom (1 HR). Pt owns cane, reports receiving home PT prior to admission.

## 2018-04-16 NOTE — DISCHARGE NOTE ADULT - PLAN OF CARE
no FX Ct scan R hip negative for fx  CT brain no acute events Follow up with your medical doctor to establish long term blood pressure treatment goals. C/W Tylenol  Home PT Ct scan R hip negative for fx  CT brain no acute events  Your CT showed a collection in the r gluteus stephan muscle. Please follow up with your orthopedic surgeon to see if you need an MRI in the future.

## 2018-04-16 NOTE — PHYSICAL THERAPY INITIAL EVALUATION ADULT - DISCHARGE DISPOSITION, PT EVAL
home w/ home PT/Home with home PT (as prior) for gait, balance, & strength training and to return pt to baseline functional mobility status. Rolling walker with ambulation (pt does not own). Supervision/assist with mobility skills at this time. **Pt cleared for d/c home from PT perspective at this time.

## 2018-04-16 NOTE — DISCHARGE NOTE ADULT - ADDITIONAL INSTRUCTIONS
please follow up with Your PCP with in a week of discharge   please follow up with Orthopedics in 4 weeks

## 2018-04-16 NOTE — PHYSICAL THERAPY INITIAL EVALUATION ADULT - GAIT DEVIATIONS NOTED, PT EVAL
decreased artie/decreased step length/decreased weight-shifting ability/Decr. R knee flex, post. lurch with swing phase

## 2018-04-16 NOTE — DISCHARGE NOTE ADULT - HOME CARE AGENCY
[facility] [phone] FOR VN AND PT. VISITING NURSE WILL CALL TO SCHEDULE VISIT DAY AFTER DISCHARGE.

## 2020-10-19 NOTE — ED PROVIDER NOTE - PELVIS
mild right hip pain with palpation./stable no pain L/no irritation L/no loss of vision R/no discharge L/no pain R/no loss of vision L/no blurred vision L/no blurred vision R/no lacrimation L/no lacrimation R/no irritation R/no diplopia/no photophobia no discharge L/no blurred vision L/no blurred vision R/no irritation L/no loss of vision R/no pain R/no irritation R/no loss of vision L/no diplopia/no photophobia/no lacrimation R/no discharge R/no pain L/no lacrimation L

## 2021-01-27 ENCOUNTER — NON-APPOINTMENT (OUTPATIENT)
Age: 71
End: 2021-01-27

## 2021-01-27 ENCOUNTER — APPOINTMENT (OUTPATIENT)
Dept: INTERNAL MEDICINE | Facility: CLINIC | Age: 71
End: 2021-01-27
Payer: MEDICARE

## 2021-01-27 ENCOUNTER — LABORATORY RESULT (OUTPATIENT)
Age: 71
End: 2021-01-27

## 2021-01-27 VITALS
DIASTOLIC BLOOD PRESSURE: 74 MMHG | BODY MASS INDEX: 24.18 KG/M2 | HEIGHT: 64.57 IN | HEART RATE: 60 BPM | WEIGHT: 143.38 LBS | SYSTOLIC BLOOD PRESSURE: 111 MMHG | OXYGEN SATURATION: 97 % | TEMPERATURE: 98.2 F

## 2021-01-27 DIAGNOSIS — I63.9 CEREBRAL INFARCTION, UNSPECIFIED: ICD-10-CM

## 2021-01-27 DIAGNOSIS — F17.210 NICOTINE DEPENDENCE, CIGARETTES, UNCOMPLICATED: ICD-10-CM

## 2021-01-27 DIAGNOSIS — Z23 ENCOUNTER FOR IMMUNIZATION: ICD-10-CM

## 2021-01-27 DIAGNOSIS — Z78.9 OTHER SPECIFIED HEALTH STATUS: ICD-10-CM

## 2021-01-27 PROCEDURE — G0439: CPT

## 2021-01-27 PROCEDURE — G0009: CPT

## 2021-01-27 PROCEDURE — G0296 VISIT TO DETERM LDCT ELIG: CPT

## 2021-01-27 PROCEDURE — 93000 ELECTROCARDIOGRAM COMPLETE: CPT

## 2021-01-27 PROCEDURE — 90732 PPSV23 VACC 2 YRS+ SUBQ/IM: CPT

## 2021-02-01 PROBLEM — I63.9 CRYPTOGENIC STROKE: Status: RESOLVED | Noted: 2021-02-01 | Resolved: 2021-02-01

## 2021-02-01 PROBLEM — Z78.9 CONSUMES ALCOHOL OCCASIONALLY: Status: ACTIVE | Noted: 2021-02-01

## 2021-02-01 NOTE — PHYSICAL EXAM
[Normal] : affect was normal and insight and judgment were intact [FreeTextEntry1] : Enlarged prostate, no nodules [de-identified] : Strength 2/5 in the right upper and right lower extremity

## 2021-02-01 NOTE — HEALTH RISK ASSESSMENT
[Good] : ~his/her~  mood as  good [] : Yes [30 or more] : 30 or more [No] : No [None] : None [Alone] : lives alone [With Patient/Caregiver] : With Patient/Caregiver [Relationship: ___] : Relationship: [unfilled] [FreeTextEntry1] : health maintenance [de-identified] : neurology [YearQuit] : 2012 [Change in mental status noted] : No change in mental status noted [Language] : denies difficulty with language [Behavior] : denies difficulty with behavior [Learning/Retaining New Information] : denies difficulty learning/retaining new information [Handling Complex Tasks] : denies difficulty handling complex tasks [Reasoning] : denies difficulty with reasoning [Spatial Ability and Orientation] : denies difficulty with spatial ability and orientation [ColonoscopyComments] : due this year [AdvancecareDate] : 1/26/2021

## 2021-02-01 NOTE — ASSESSMENT
[FreeTextEntry1] : Blood work done done at the office today blood pressure acceptable, check lipid panel goal LDL less than 70 check PSA levels given enlarged prostate Pneumovax administered.  Given fall from standing position advised to have DEXA scan and CT lung cancer screening.  Patient and son currently deferred due to the current pandemic.

## 2021-02-01 NOTE — HISTORY OF PRESENT ILLNESS
[de-identified] : 70-year-old male with past medical history of cryptogenic stroke chronic right-sided deficit status post femoral fracture in 2015 and depression presents for Medicare annual wellness visit.  Is also former smoker smokes for about a pack and a half for 30 years.  Has never had low-dose CT or DEXA scan.  Currently feels well denies any chest pain chest tightness shortness of breath.  Has had mechanical falls at home denies any dizziness denies any potation's or any worsening lower extremity weakness.

## 2021-02-02 LAB
25(OH)D3 SERPL-MCNC: 21.9 NG/ML
ALBUMIN SERPL ELPH-MCNC: 4.3 G/DL
ALP BLD-CCNC: 73 U/L
ALT SERPL-CCNC: 18 U/L
ANION GAP SERPL CALC-SCNC: 14 MMOL/L
APPEARANCE: CLEAR
APPEARANCE: CLEAR
AST SERPL-CCNC: 20 U/L
BACTERIA: NEGATIVE
BASOPHILS # BLD AUTO: 0.02 K/UL
BASOPHILS NFR BLD AUTO: 0.4 %
BILIRUB SERPL-MCNC: 0.2 MG/DL
BILIRUBIN URINE: NEGATIVE
BILIRUBIN URINE: NEGATIVE
BLOOD URINE: ABNORMAL
BLOOD URINE: ABNORMAL
BUN SERPL-MCNC: 9 MG/DL
CALCIUM SERPL-MCNC: 9.1 MG/DL
CHLORIDE SERPL-SCNC: 101 MMOL/L
CHOLEST SERPL-MCNC: 176 MG/DL
CO2 SERPL-SCNC: 27 MMOL/L
COLOR: NORMAL
COLOR: NORMAL
CREAT SERPL-MCNC: 0.9 MG/DL
EOSINOPHIL # BLD AUTO: 0.09 K/UL
EOSINOPHIL NFR BLD AUTO: 1.8 %
ESTIMATED AVERAGE GLUCOSE: 105 MG/DL
GLUCOSE QUALITATIVE U: NEGATIVE
GLUCOSE QUALITATIVE U: NEGATIVE
GLUCOSE SERPL-MCNC: 87 MG/DL
HBA1C MFR BLD HPLC: 5.3 %
HCT VFR BLD CALC: 41.9 %
HDLC SERPL-MCNC: 71 MG/DL
HGB BLD-MCNC: 13.2 G/DL
HYALINE CASTS: 1 /LPF
IMM GRANULOCYTES NFR BLD AUTO: 0 %
KETONES URINE: NEGATIVE
KETONES URINE: NEGATIVE
LDLC SERPL CALC-MCNC: 93 MG/DL
LEUKOCYTE ESTERASE URINE: NEGATIVE
LEUKOCYTE ESTERASE URINE: NEGATIVE
LYMPHOCYTES # BLD AUTO: 1.3 K/UL
LYMPHOCYTES NFR BLD AUTO: 25.7 %
MAN DIFF?: NORMAL
MCHC RBC-ENTMCNC: 30.8 PG
MCHC RBC-ENTMCNC: 31.5 GM/DL
MCV RBC AUTO: 97.9 FL
MICROSCOPIC-UA: NORMAL
MONOCYTES # BLD AUTO: 0.43 K/UL
MONOCYTES NFR BLD AUTO: 8.5 %
NEUTROPHILS # BLD AUTO: 3.21 K/UL
NEUTROPHILS NFR BLD AUTO: 63.6 %
NITRITE URINE: NEGATIVE
NITRITE URINE: NEGATIVE
NONHDLC SERPL-MCNC: 105 MG/DL
PH URINE: 6
PH URINE: 6
PLATELET # BLD AUTO: 161 K/UL
POTASSIUM SERPL-SCNC: 4.3 MMOL/L
PROT SERPL-MCNC: 6.7 G/DL
PROTEIN URINE: NEGATIVE
PROTEIN URINE: NEGATIVE
PSA SERPL-MCNC: 3.03 NG/ML
RBC # BLD: 4.28 M/UL
RBC # FLD: 14 %
RED BLOOD CELLS URINE: 3 /HPF
SODIUM SERPL-SCNC: 142 MMOL/L
SPECIFIC GRAVITY URINE: 1.01
SPECIFIC GRAVITY URINE: 1.01
SQUAMOUS EPITHELIAL CELLS: 0 /HPF
TRIGL SERPL-MCNC: 60 MG/DL
TSH SERPL-ACNC: 2.58 UIU/ML
UROBILINOGEN URINE: NORMAL
UROBILINOGEN URINE: NORMAL
VIT B12 SERPL-MCNC: 389 PG/ML
WBC # FLD AUTO: 5.05 K/UL
WHITE BLOOD CELLS URINE: 2 /HPF

## 2021-04-17 NOTE — ED ADULT NURSE NOTE - EENT WDL
Please follow up with your PCP.
Eyes with no visual disturbances.  Ears clean and dry and no hearing difficulties. Nose with pink mucosa and no drainage.  Mouth mucous membranes moist and pink.  No tenderness or swelling to throat or neck.

## 2021-06-04 ENCOUNTER — APPOINTMENT (OUTPATIENT)
Dept: INTERNAL MEDICINE | Facility: CLINIC | Age: 71
End: 2021-06-04

## 2021-06-10 RX ORDER — ASPIRIN 81 MG/1
81 TABLET, CHEWABLE ORAL DAILY
Qty: 90 | Refills: 3 | Status: ACTIVE | COMMUNITY
Start: 2021-06-10 | End: 1900-01-01

## 2021-07-02 ENCOUNTER — LABORATORY RESULT (OUTPATIENT)
Age: 71
End: 2021-07-02

## 2021-07-02 ENCOUNTER — APPOINTMENT (OUTPATIENT)
Dept: INTERNAL MEDICINE | Facility: CLINIC | Age: 71
End: 2021-07-02
Payer: MEDICARE

## 2021-07-02 ENCOUNTER — RESULT REVIEW (OUTPATIENT)
Age: 71
End: 2021-07-02

## 2021-07-02 VITALS
BODY MASS INDEX: 24.27 KG/M2 | HEART RATE: 54 BPM | SYSTOLIC BLOOD PRESSURE: 109 MMHG | DIASTOLIC BLOOD PRESSURE: 65 MMHG | HEIGHT: 64.57 IN | TEMPERATURE: 96.8 F | WEIGHT: 143.94 LBS | OXYGEN SATURATION: 98 %

## 2021-07-02 DIAGNOSIS — R30.0 DYSURIA: ICD-10-CM

## 2021-07-02 DIAGNOSIS — Z00.00 ENCOUNTER FOR GENERAL ADULT MEDICAL EXAMINATION W/OUT ABNORMAL FINDINGS: ICD-10-CM

## 2021-07-02 PROCEDURE — 99213 OFFICE O/P EST LOW 20 MIN: CPT | Mod: 25

## 2021-07-02 PROCEDURE — 36415 COLL VENOUS BLD VENIPUNCTURE: CPT

## 2021-07-02 NOTE — ASSESSMENT
[FreeTextEntry1] : To follow-up with diet for colonoscopy has had recent constipation currently on MiraLAX and taking fiber feels better will have colonoscopy.  Check lipid panel today palpable bladder to have renal ultrasound check PVR patient currently on Flomax, would not increase to twice a day given low blood pressure may require urology referral.  DEXA scan ordered as patient has had fall that occurred from standing height and received fracture.  Vitamin D levels today.

## 2021-07-02 NOTE — PHYSICAL EXAM
[Non Tender] : non-tender [Normal] : no joint swelling and grossly normal strength and tone [de-identified] : right arm in sling [de-identified] : palpable bladder

## 2021-07-02 NOTE — HISTORY OF PRESENT ILLNESS
[FreeTextEntry1] : 71-year-old male presents for follow-up [de-identified] : Difficulty initiating urine, recently has noticed slight burning.  Does not feel complete emptying of the bladder.  Denies any hematuria flank pain fevers rigors chest pain.  Currently on Flomax.  Denies any other falls new headaches.  Will be following up with GI for colonoscopy.  Interested in shingles vaccine.

## 2021-07-10 LAB
25(OH)D3 SERPL-MCNC: 15.1 NG/ML
ALBUMIN SERPL ELPH-MCNC: 4.4 G/DL
ALP BLD-CCNC: 82 U/L
ALT SERPL-CCNC: 13 U/L
ANION GAP SERPL CALC-SCNC: 10 MMOL/L
APPEARANCE: CLEAR
APPEARANCE: CLEAR
AST SERPL-CCNC: 19 U/L
BACTERIA: NEGATIVE
BASOPHILS # BLD AUTO: 0.02 K/UL
BASOPHILS NFR BLD AUTO: 0.4 %
BILIRUB SERPL-MCNC: 0.3 MG/DL
BILIRUBIN URINE: NEGATIVE
BILIRUBIN URINE: NEGATIVE
BLOOD URINE: ABNORMAL
BLOOD URINE: ABNORMAL
BUN SERPL-MCNC: 10 MG/DL
CALCIUM SERPL-MCNC: 9 MG/DL
CHLORIDE SERPL-SCNC: 104 MMOL/L
CHOLEST SERPL-MCNC: 179 MG/DL
CO2 SERPL-SCNC: 27 MMOL/L
COLOR: NORMAL
COLOR: NORMAL
CREAT SERPL-MCNC: 0.8 MG/DL
EOSINOPHIL # BLD AUTO: 0.09 K/UL
EOSINOPHIL NFR BLD AUTO: 1.8 %
ESTIMATED AVERAGE GLUCOSE: 108 MG/DL
GLUCOSE QUALITATIVE U: NEGATIVE
GLUCOSE QUALITATIVE U: NEGATIVE
GLUCOSE SERPL-MCNC: 96 MG/DL
HBA1C MFR BLD HPLC: 5.4 %
HCT VFR BLD CALC: 40.9 %
HDLC SERPL-MCNC: 78 MG/DL
HGB BLD-MCNC: 13.4 G/DL
HYALINE CASTS: 1 /LPF
IMM GRANULOCYTES NFR BLD AUTO: 0 %
KETONES URINE: NEGATIVE
KETONES URINE: NEGATIVE
LDLC SERPL CALC-MCNC: 88 MG/DL
LEUKOCYTE ESTERASE URINE: NEGATIVE
LEUKOCYTE ESTERASE URINE: NEGATIVE
LYMPHOCYTES # BLD AUTO: 1.29 K/UL
LYMPHOCYTES NFR BLD AUTO: 25.8 %
MAN DIFF?: NORMAL
MCHC RBC-ENTMCNC: 30.6 PG
MCHC RBC-ENTMCNC: 32.8 GM/DL
MCV RBC AUTO: 93.4 FL
MICROSCOPIC-UA: NORMAL
MONOCYTES # BLD AUTO: 0.4 K/UL
MONOCYTES NFR BLD AUTO: 8 %
NEUTROPHILS # BLD AUTO: 3.2 K/UL
NEUTROPHILS NFR BLD AUTO: 64 %
NITRITE URINE: NEGATIVE
NITRITE URINE: NEGATIVE
NONHDLC SERPL-MCNC: 102 MG/DL
PH URINE: 6.5
PH URINE: 6.5
PLATELET # BLD AUTO: 185 K/UL
POTASSIUM SERPL-SCNC: 4.2 MMOL/L
PROT SERPL-MCNC: 6.8 G/DL
PROTEIN URINE: NEGATIVE
PROTEIN URINE: NEGATIVE
RBC # BLD: 4.38 M/UL
RBC # FLD: 13.2 %
RED BLOOD CELLS URINE: 3 /HPF
SODIUM SERPL-SCNC: 141 MMOL/L
SPECIFIC GRAVITY URINE: 1.02
SPECIFIC GRAVITY URINE: 1.02
SQUAMOUS EPITHELIAL CELLS: 0 /HPF
TRIGL SERPL-MCNC: 66 MG/DL
TSH SERPL-ACNC: 2.55 UIU/ML
UROBILINOGEN URINE: NORMAL
UROBILINOGEN URINE: NORMAL
WBC # FLD AUTO: 5 K/UL
WHITE BLOOD CELLS URINE: 1 /HPF

## 2021-07-30 ENCOUNTER — APPOINTMENT (OUTPATIENT)
Dept: ULTRASOUND IMAGING | Facility: CLINIC | Age: 71
End: 2021-07-30
Payer: MEDICARE

## 2021-07-30 ENCOUNTER — APPOINTMENT (OUTPATIENT)
Dept: RADIOLOGY | Facility: CLINIC | Age: 71
End: 2021-07-30
Payer: MEDICARE

## 2021-07-30 PROCEDURE — 77085 DXA BONE DENSITY AXL VRT FX: CPT

## 2021-07-30 PROCEDURE — 76770 US EXAM ABDO BACK WALL COMP: CPT

## 2021-07-30 PROCEDURE — 76775 US EXAM ABDO BACK WALL LIM: CPT | Mod: 59

## 2021-08-05 ENCOUNTER — NON-APPOINTMENT (OUTPATIENT)
Age: 71
End: 2021-08-05

## 2022-02-11 ENCOUNTER — LABORATORY RESULT (OUTPATIENT)
Age: 72
End: 2022-02-11

## 2022-02-11 ENCOUNTER — APPOINTMENT (OUTPATIENT)
Dept: INTERNAL MEDICINE | Facility: CLINIC | Age: 72
End: 2022-02-11
Payer: MEDICARE

## 2022-02-11 VITALS
HEIGHT: 65.16 IN | HEART RATE: 64 BPM | TEMPERATURE: 96.9 F | DIASTOLIC BLOOD PRESSURE: 69 MMHG | SYSTOLIC BLOOD PRESSURE: 112 MMHG | BODY MASS INDEX: 23.68 KG/M2 | OXYGEN SATURATION: 99 % | WEIGHT: 143.83 LBS

## 2022-02-11 DIAGNOSIS — K59.00 CONSTIPATION, UNSPECIFIED: ICD-10-CM

## 2022-02-11 DIAGNOSIS — N40.0 BENIGN PROSTATIC HYPERPLASIA WITHOUT LOWER URINARY TRACT SYMPMS: ICD-10-CM

## 2022-02-11 DIAGNOSIS — R79.89 OTHER SPECIFIED ABNORMAL FINDINGS OF BLOOD CHEMISTRY: ICD-10-CM

## 2022-02-11 DIAGNOSIS — R29.6 REPEATED FALLS: ICD-10-CM

## 2022-02-11 DIAGNOSIS — Z86.73 PERSONAL HISTORY OF TRANSIENT ISCHEMIC ATTACK (TIA), AND CEREBRAL INFARCTION W/OUT RESIDUAL DEFICITS: ICD-10-CM

## 2022-02-11 DIAGNOSIS — R42 DIZZINESS AND GIDDINESS: ICD-10-CM

## 2022-02-11 PROCEDURE — 36415 COLL VENOUS BLD VENIPUNCTURE: CPT

## 2022-02-11 PROCEDURE — 99213 OFFICE O/P EST LOW 20 MIN: CPT | Mod: 25

## 2022-02-11 PROCEDURE — G0439: CPT

## 2022-02-11 RX ORDER — POLYETHYLENE GLYCOL 3350 17 G/17G
17 POWDER, FOR SOLUTION ORAL
Qty: 1 | Refills: 3 | Status: ACTIVE | COMMUNITY
Start: 2021-06-10 | End: 1900-01-01

## 2022-02-11 RX ORDER — ERGOCALCIFEROL 1.25 MG/1
1.25 MG CAPSULE, LIQUID FILLED ORAL
Qty: 12 | Refills: 3 | Status: ACTIVE | COMMUNITY
Start: 2021-07-10 | End: 1900-01-01

## 2022-02-11 NOTE — HISTORY OF PRESENT ILLNESS
[Family Member] : family member [FreeTextEntry1] : Patient presents to the office for subsequent Medicare annual wellness visit and chronic disease management [de-identified] : Does have increased gait instability and weakness and is accompanied today with his son\par Almost had a fall approximately a few months ago\par Does have some urinary hesitancy which has improved\par Denies any chest pain chest tightness shortness of breath \par Gets slightly lightheaded when going from sitting to standing last momentarily denies any palpitations

## 2022-02-11 NOTE — HEALTH RISK ASSESSMENT
[Good] : ~his/her~  mood as  good [FreeTextEntry1] : health maintenance, bph [Former] : Former [No] : No [Assistive Device] : Patient uses an assistive device [0] : 2) Feeling down, depressed, or hopeless: Not at all (0) [de-identified] : none [Change in mental status noted] : No change in mental status noted [Language] : denies difficulty with language [Behavior] : denies difficulty with behavior [Learning/Retaining New Information] : denies difficulty learning/retaining new information [Handling Complex Tasks] : denies difficulty handling complex tasks [Reasoning] : denies difficulty with reasoning [Spatial Ability and Orientation] : denies difficulty with spatial ability and orientation [Transportation] : transportation [With Family] : lives with family [Independent] : managing medications [Some assistance needed] : managing finances [Reports changes in hearing] : Reports no changes in hearing [Reports changes in vision] : Reports no changes in vision [Reports normal functional visual acuity (ie: able to read med bottle)] : Reports normal functional visual acuity [Reports changes in dental health] : Reports no changes in dental health [Smoke Detector] : smoke detector [Carbon Monoxide Detector] : carbon monoxide detector [Guns at Home] : no guns at home [Safety elements used in home] : safety elements used in home [Seat Belt] :  uses seat belt [Sunscreen] : uses sunscreen [Travel to Developing Areas] : does not  travel to developing areas [TB Exposure] : is not being exposed to tuberculosis [Caregiver Concerns] : does not have caregiver concerns [AdvancecareDate] : 2/22

## 2022-02-11 NOTE — ASSESSMENT
[FreeTextEntry1] : Physical therapy ordered for multiple falls blood pressure adequate check LDL given history of stroke\par Lightheadedness may be secondary to a drop in blood pressure, advised to increase hydration\par Constipation stable\par Continue current management for BPH

## 2022-02-11 NOTE — REASON FOR VISIT
[Annual Wellness Visit] : an annual wellness visit [FreeTextEntry1] : Patient presents to the office for subsequent Medicare annual wellness visit and chronic disease management

## 2022-02-16 LAB
25(OH)D3 SERPL-MCNC: 32.3 NG/ML
ALBUMIN SERPL ELPH-MCNC: 4.4 G/DL
ALP BLD-CCNC: 82 U/L
ALT SERPL-CCNC: 14 U/L
ANION GAP SERPL CALC-SCNC: 11 MMOL/L
APPEARANCE: CLEAR
APPEARANCE: CLEAR
AST SERPL-CCNC: 19 U/L
BACTERIA: NEGATIVE
BASOPHILS # BLD AUTO: 0.02 K/UL
BASOPHILS NFR BLD AUTO: 0.4 %
BILIRUB SERPL-MCNC: 0.4 MG/DL
BILIRUBIN URINE: NEGATIVE
BILIRUBIN URINE: NEGATIVE
BLOOD URINE: ABNORMAL
BLOOD URINE: ABNORMAL
BUN SERPL-MCNC: 10 MG/DL
CALCIUM SERPL-MCNC: 9.3 MG/DL
CHLORIDE SERPL-SCNC: 103 MMOL/L
CHOLEST SERPL-MCNC: 175 MG/DL
CO2 SERPL-SCNC: 26 MMOL/L
COLOR: NORMAL
COLOR: NORMAL
CREAT SERPL-MCNC: 0.8 MG/DL
EOSINOPHIL # BLD AUTO: 0.07 K/UL
EOSINOPHIL NFR BLD AUTO: 1.4 %
ESTIMATED AVERAGE GLUCOSE: 111 MG/DL
GLUCOSE QUALITATIVE U: NEGATIVE
GLUCOSE QUALITATIVE U: NEGATIVE
GLUCOSE SERPL-MCNC: 89 MG/DL
HBA1C MFR BLD HPLC: 5.5 %
HCT VFR BLD CALC: 39.9 %
HDLC SERPL-MCNC: 69 MG/DL
HGB BLD-MCNC: 13.2 G/DL
HYALINE CASTS: 1 /LPF
IMM GRANULOCYTES NFR BLD AUTO: 0.2 %
KETONES URINE: NEGATIVE
KETONES URINE: NEGATIVE
LDLC SERPL CALC-MCNC: 93 MG/DL
LEUKOCYTE ESTERASE URINE: NEGATIVE
LEUKOCYTE ESTERASE URINE: NEGATIVE
LYMPHOCYTES # BLD AUTO: 1.41 K/UL
LYMPHOCYTES NFR BLD AUTO: 28.6 %
MAN DIFF?: NORMAL
MCHC RBC-ENTMCNC: 30.9 PG
MCHC RBC-ENTMCNC: 33.1 GM/DL
MCV RBC AUTO: 93.4 FL
MICROSCOPIC-UA: NORMAL
MONOCYTES # BLD AUTO: 0.44 K/UL
MONOCYTES NFR BLD AUTO: 8.9 %
NEUTROPHILS # BLD AUTO: 2.98 K/UL
NEUTROPHILS NFR BLD AUTO: 60.5 %
NITRITE URINE: NEGATIVE
NITRITE URINE: NEGATIVE
NONHDLC SERPL-MCNC: 106 MG/DL
PH URINE: 6
PH URINE: 6
PLATELET # BLD AUTO: 147 K/UL
POTASSIUM SERPL-SCNC: 4.4 MMOL/L
PROT SERPL-MCNC: 6.3 G/DL
PROTEIN URINE: NEGATIVE
PROTEIN URINE: NEGATIVE
RBC # BLD: 4.27 M/UL
RBC # FLD: 13.5 %
RED BLOOD CELLS URINE: 3 /HPF
SODIUM SERPL-SCNC: 141 MMOL/L
SPECIFIC GRAVITY URINE: 1.01
SPECIFIC GRAVITY URINE: 1.01
SQUAMOUS EPITHELIAL CELLS: 1 /HPF
TRIGL SERPL-MCNC: 65 MG/DL
TSH SERPL-ACNC: 1.95 UIU/ML
UROBILINOGEN URINE: NORMAL
UROBILINOGEN URINE: NORMAL
VIT B12 SERPL-MCNC: 316 PG/ML
WBC # FLD AUTO: 4.93 K/UL
WHITE BLOOD CELLS URINE: 2 /HPF

## 2022-02-17 ENCOUNTER — NON-APPOINTMENT (OUTPATIENT)
Age: 72
End: 2022-02-17

## 2022-05-16 NOTE — ED ADULT NURSE NOTE - MUSCULOSKELETAL ASSESSMENT
- - - Stelara Counseling:  I discussed with the patient the risks of ustekinumab including but not limited to immunosuppression, malignancy, posterior leukoencephalopathy syndrome, and serious infections.  The patient understands that monitoring is required including a PPD at baseline and must alert us or the primary physician if symptoms of infection or other concerning signs are noted.

## 2022-07-26 NOTE — H&P ADULT - PMH
CVA (cerebral vascular accident)    Weakness due to acute cerebrovascular accident (CVA)  right side Improved

## 2022-07-29 ENCOUNTER — RX RENEWAL (OUTPATIENT)
Age: 72
End: 2022-07-29

## 2022-10-26 ENCOUNTER — RX RENEWAL (OUTPATIENT)
Age: 72
End: 2022-10-26

## 2023-01-24 ENCOUNTER — RX RENEWAL (OUTPATIENT)
Age: 73
End: 2023-01-24

## 2023-02-03 NOTE — PHYSICAL THERAPY INITIAL EVALUATION ADULT - FUNCTIONAL LIMITATIONS, PT EVAL
Secondary Intention Text (Leave Blank If You Do Not Want): The defect will heal with secondary intention. home management/self-care

## 2023-03-01 ENCOUNTER — APPOINTMENT (OUTPATIENT)
Dept: INTERNAL MEDICINE | Facility: CLINIC | Age: 73
End: 2023-03-01
Payer: MEDICARE

## 2023-03-01 VITALS
HEIGHT: 64.96 IN | HEART RATE: 55 BPM | DIASTOLIC BLOOD PRESSURE: 83 MMHG | OXYGEN SATURATION: 99 % | SYSTOLIC BLOOD PRESSURE: 143 MMHG | BODY MASS INDEX: 25.21 KG/M2 | TEMPERATURE: 97.3 F | WEIGHT: 151.31 LBS

## 2023-03-01 DIAGNOSIS — Z87.891 PERSONAL HISTORY OF NICOTINE DEPENDENCE: ICD-10-CM

## 2023-03-01 DIAGNOSIS — N50.819 TESTICULAR PAIN, UNSPECIFIED: ICD-10-CM

## 2023-03-01 DIAGNOSIS — Z00.00 ENCOUNTER FOR GENERAL ADULT MEDICAL EXAMINATION W/OUT ABNORMAL FINDINGS: ICD-10-CM

## 2023-03-01 PROCEDURE — 36415 COLL VENOUS BLD VENIPUNCTURE: CPT

## 2023-03-01 PROCEDURE — 99213 OFFICE O/P EST LOW 20 MIN: CPT | Mod: 25

## 2023-03-01 PROCEDURE — G0439: CPT

## 2023-03-01 NOTE — HISTORY OF PRESENT ILLNESS
[FreeTextEntry1] : Patient presents for follow-up. chronic disease management\par Patient presents for annual wellness visit. [de-identified] : Patient c/o intermittent lightheadedness with standing which occurs for few seconds.\par Did physical Therapy, founded partially beneficial.\par Denies any dizziness, palpitations, CP\par Denies any abdominal pain, change in bowel habits.\par \par States he has testicular pain a/w dysuria. Denies any hematuria.

## 2023-03-01 NOTE — HEALTH RISK ASSESSMENT
[Good] : ~his/her~  mood as  good [No] : In the past 12 months have you used drugs other than those required for medical reasons? No [Assistive Device] : Patient uses an assistive device [0] : 2) Feeling down, depressed, or hopeless: Not at all (0) [Transportation] : transportation [With Family] : lives with family [Independent] : managing medications [Some assistance needed] : managing finances [Reports normal functional visual acuity (ie: able to read med bottle)] : Reports normal functional visual acuity [Smoke Detector] : smoke detector [Carbon Monoxide Detector] : carbon monoxide detector [Safety elements used in home] : safety elements used in home [Seat Belt] :  uses seat belt [Sunscreen] : uses sunscreen [Never] : Never [FreeTextEntry1] : Health Maintenance [de-identified] : none [Change in mental status noted] : No change in mental status noted [Language] : denies difficulty with language [Behavior] : denies difficulty with behavior [Learning/Retaining New Information] : denies difficulty learning/retaining new information [Handling Complex Tasks] : denies difficulty handling complex tasks [Reasoning] : denies difficulty with reasoning [Spatial Ability and Orientation] : denies difficulty with spatial ability and orientation [Reports changes in hearing] : Reports no changes in hearing [Reports changes in vision] : Reports no changes in vision [Reports changes in dental health] : Reports no changes in dental health [Guns at Home] : no guns at home [Travel to Developing Areas] : does not  travel to developing areas [TB Exposure] : is not being exposed to tuberculosis [Caregiver Concerns] : does not have caregiver concerns

## 2023-03-01 NOTE — ADDENDUM
[FreeTextEntry1] : I, Francis Dubon, acted as a scribe on behalf of Dr. Clovis Tena MD, on 03/01/2023. \par \par All medical entries made by the scribe were at my, Dr. Clovis Tena MD, direction and personally dictated by me on 03/01/2023. I have reviewed the chart and agree that the record accurately reflects my personal performance of the history, physical exam, assessment and plan. I have also personally directed, reviewed, and agreed with the chart.

## 2023-03-01 NOTE — REVIEW OF SYSTEMS
[Negative] : Heme/Lymph [Dysuria] : dysuria [FreeTextEntry8] : Testicular pain [de-identified] : lightheadedness

## 2023-03-01 NOTE — ASSESSMENT
[FreeTextEntry1] : Annual Wellness Visit\par -Blood work done today\par -BP is stable. Continue current management\par -Check A1c, lipid panel and Vitamin levels\par -Check cholesterol levels.\par -Testicular US ordered. Check UA. Advised slow movements with sitting to standing.\par -Continue tamsulosin for BPH.\par -Low dose CT scan Ordered given Smoking Hx.\par -RTO in 6 months.

## 2023-03-01 NOTE — PHYSICAL EXAM
[No Acute Distress] : no acute distress [Well Nourished] : well nourished [Well Developed] : well developed [Well-Appearing] : well-appearing [Normal Sclera/Conjunctiva] : normal sclera/conjunctiva [PERRL] : pupils equal round and reactive to light [EOMI] : extraocular movements intact [Normal Outer Ear/Nose] : the outer ears and nose were normal in appearance [Normal Oropharynx] : the oropharynx was normal [No JVD] : no jugular venous distention [No Lymphadenopathy] : no lymphadenopathy [Supple] : supple [Thyroid Normal, No Nodules] : the thyroid was normal and there were no nodules present [No Respiratory Distress] : no respiratory distress  [No Accessory Muscle Use] : no accessory muscle use [Clear to Auscultation] : lungs were clear to auscultation bilaterally [Normal Rate] : normal rate  [Regular Rhythm] : with a regular rhythm [Normal S1, S2] : normal S1 and S2 [No Murmur] : no murmur heard [No Carotid Bruits] : no carotid bruits [No Abdominal Bruit] : a ~M bruit was not heard ~T in the abdomen [No Varicosities] : no varicosities [Pedal Pulses Present] : the pedal pulses are present [No Edema] : there was no peripheral edema [No Palpable Aorta] : no palpable aorta [No Extremity Clubbing/Cyanosis] : no extremity clubbing/cyanosis [Soft] : abdomen soft [Non Tender] : non-tender [Non-distended] : non-distended [No Masses] : no abdominal mass palpated [No HSM] : no HSM [Normal Bowel Sounds] : normal bowel sounds [Normal Posterior Cervical Nodes] : no posterior cervical lymphadenopathy [Normal Anterior Cervical Nodes] : no anterior cervical lymphadenopathy [No CVA Tenderness] : no CVA  tenderness [No Spinal Tenderness] : no spinal tenderness [No Joint Swelling] : no joint swelling [Grossly Normal Strength/Tone] : grossly normal strength/tone [No Rash] : no rash [Coordination Grossly Intact] : coordination grossly intact [No Focal Deficits] : no focal deficits [Normal Gait] : normal gait [Deep Tendon Reflexes (DTR)] : deep tendon reflexes were 2+ and symmetric [Normal Affect] : the affect was normal [Normal Insight/Judgement] : insight and judgment were intact [de-identified] : No testicular tenderness [de-identified] : Stiffness of Upper LE

## 2023-03-06 LAB
25(OH)D3 SERPL-MCNC: 32.5 NG/ML
ALBUMIN SERPL ELPH-MCNC: 4.5 G/DL
ALP BLD-CCNC: 76 U/L
ALT SERPL-CCNC: 20 U/L
ANION GAP SERPL CALC-SCNC: 12 MMOL/L
APPEARANCE: CLEAR
AST SERPL-CCNC: 24 U/L
BACTERIA: NEGATIVE
BASOPHILS # BLD AUTO: 0.03 K/UL
BASOPHILS NFR BLD AUTO: 0.6 %
BILIRUB SERPL-MCNC: 0.4 MG/DL
BILIRUBIN URINE: NEGATIVE
BLOOD URINE: ABNORMAL
BUN SERPL-MCNC: 9 MG/DL
CALCIUM SERPL-MCNC: 9.4 MG/DL
CHLORIDE SERPL-SCNC: 102 MMOL/L
CHOLEST SERPL-MCNC: 215 MG/DL
CO2 SERPL-SCNC: 25 MMOL/L
COLOR: NORMAL
CREAT SERPL-MCNC: 0.8 MG/DL
EGFR: 95 ML/MIN/1.73M2
EOSINOPHIL # BLD AUTO: 0.09 K/UL
EOSINOPHIL NFR BLD AUTO: 1.8 %
ESTIMATED AVERAGE GLUCOSE: 117 MG/DL
GLUCOSE QUALITATIVE U: NEGATIVE
GLUCOSE SERPL-MCNC: 95 MG/DL
HBA1C MFR BLD HPLC: 5.7 %
HCT VFR BLD CALC: 42 %
HDLC SERPL-MCNC: 72 MG/DL
HGB BLD-MCNC: 13.5 G/DL
HYALINE CASTS: 0 /LPF
IMM GRANULOCYTES NFR BLD AUTO: 0.2 %
KETONES URINE: NEGATIVE
LDLC SERPL CALC-MCNC: 123 MG/DL
LEUKOCYTE ESTERASE URINE: NEGATIVE
LYMPHOCYTES # BLD AUTO: 1.34 K/UL
LYMPHOCYTES NFR BLD AUTO: 27.5 %
MAN DIFF?: NORMAL
MCHC RBC-ENTMCNC: 30.3 PG
MCHC RBC-ENTMCNC: 32.1 GM/DL
MCV RBC AUTO: 94.2 FL
MICROSCOPIC-UA: NORMAL
MONOCYTES # BLD AUTO: 0.39 K/UL
MONOCYTES NFR BLD AUTO: 8 %
NEUTROPHILS # BLD AUTO: 3.02 K/UL
NEUTROPHILS NFR BLD AUTO: 61.9 %
NITRITE URINE: NEGATIVE
NONHDLC SERPL-MCNC: 143 MG/DL
PH URINE: 7
PLATELET # BLD AUTO: 161 K/UL
POTASSIUM SERPL-SCNC: 4.9 MMOL/L
PROT SERPL-MCNC: 7 G/DL
PROTEIN URINE: NEGATIVE
PSA SERPL-MCNC: 4.13 NG/ML
RBC # BLD: 4.46 M/UL
RBC # FLD: 13.4 %
RED BLOOD CELLS URINE: 11 /HPF
SODIUM SERPL-SCNC: 140 MMOL/L
SPECIFIC GRAVITY URINE: 1.01
SQUAMOUS EPITHELIAL CELLS: 0 /HPF
TRIGL SERPL-MCNC: 103 MG/DL
TSH SERPL-ACNC: 2.85 UIU/ML
UROBILINOGEN URINE: NORMAL
VIT B12 SERPL-MCNC: 443 PG/ML
WBC # FLD AUTO: 4.88 K/UL
WHITE BLOOD CELLS URINE: 1 /HPF

## 2023-04-24 ENCOUNTER — RX RENEWAL (OUTPATIENT)
Age: 73
End: 2023-04-24

## 2023-07-24 ENCOUNTER — RX RENEWAL (OUTPATIENT)
Age: 73
End: 2023-07-24

## 2023-08-21 ENCOUNTER — APPOINTMENT (OUTPATIENT)
Dept: INTERNAL MEDICINE | Facility: CLINIC | Age: 73
End: 2023-08-21
Payer: MEDICARE

## 2023-08-21 VITALS
SYSTOLIC BLOOD PRESSURE: 107 MMHG | HEIGHT: 65 IN | OXYGEN SATURATION: 98 % | WEIGHT: 150 LBS | HEART RATE: 56 BPM | TEMPERATURE: 97.9 F | DIASTOLIC BLOOD PRESSURE: 67 MMHG | BODY MASS INDEX: 24.99 KG/M2

## 2023-08-21 DIAGNOSIS — M77.10 LATERAL EPICONDYLITIS, UNSPECIFIED ELBOW: ICD-10-CM

## 2023-08-21 PROCEDURE — 99213 OFFICE O/P EST LOW 20 MIN: CPT

## 2023-08-22 NOTE — PHYSICAL EXAM
[Normal] : no acute distress, well nourished, well developed and well-appearing [de-identified] : Tenderness over the lateral epicondyle worse with flexion of the hand

## 2023-08-22 NOTE — ASSESSMENT
[FreeTextEntry1] : Suspect lateral epicondylitis patient has been using walker and gripping, advised elbow sleeve Voltaren gel stretching exercises discussed with patient

## 2023-08-22 NOTE — HISTORY OF PRESENT ILLNESS
[FreeTextEntry8] : Patient presents to the office has tenderness of the left elbow, has been using a walker to walk, denies any numbness neck pain.  Denies any weakness overlying rashes erythema.

## 2023-10-04 NOTE — ED ADULT TRIAGE NOTE - AS O2 DELIVERY
No care due was identified.  [facility] Embedded Care Due Messages. Reference number: [id number].   10/04/2023 3:21:28 PM CDT  
Responded to portal msg.  Meloxicam has refills and responded to portal msg informing.  
room air

## 2023-10-21 ENCOUNTER — RX RENEWAL (OUTPATIENT)
Age: 73
End: 2023-10-21

## 2023-11-30 RX ORDER — CITALOPRAM HYDROBROMIDE 20 MG/1
20 TABLET, FILM COATED ORAL
Qty: 90 | Refills: 3 | Status: ACTIVE | COMMUNITY
Start: 2021-08-16 | End: 1900-01-01

## 2023-11-30 RX ORDER — ASPIRIN 81 MG/1
81 TABLET, CHEWABLE ORAL DAILY
Qty: 90 | Refills: 3 | Status: ACTIVE | COMMUNITY
Start: 2022-02-11 | End: 1900-01-01

## 2024-01-19 ENCOUNTER — RX RENEWAL (OUTPATIENT)
Age: 74
End: 2024-01-19

## 2024-02-05 ENCOUNTER — APPOINTMENT (OUTPATIENT)
Dept: INTERNAL MEDICINE | Facility: CLINIC | Age: 74
End: 2024-02-05
Payer: MEDICARE

## 2024-02-05 ENCOUNTER — LABORATORY RESULT (OUTPATIENT)
Age: 74
End: 2024-02-05

## 2024-02-05 VITALS
HEIGHT: 65 IN | SYSTOLIC BLOOD PRESSURE: 119 MMHG | TEMPERATURE: 97.3 F | DIASTOLIC BLOOD PRESSURE: 77 MMHG | WEIGHT: 156 LBS | HEART RATE: 65 BPM | OXYGEN SATURATION: 97 % | BODY MASS INDEX: 25.99 KG/M2

## 2024-02-05 DIAGNOSIS — R10.13 EPIGASTRIC PAIN: ICD-10-CM

## 2024-02-05 DIAGNOSIS — E78.5 HYPERLIPIDEMIA, UNSPECIFIED: ICD-10-CM

## 2024-02-05 PROCEDURE — 99214 OFFICE O/P EST MOD 30 MIN: CPT

## 2024-02-05 PROCEDURE — G2211 COMPLEX E/M VISIT ADD ON: CPT

## 2024-02-05 PROCEDURE — 36415 COLL VENOUS BLD VENIPUNCTURE: CPT

## 2024-02-05 NOTE — HISTORY OF PRESENT ILLNESS
[FreeTextEntry1] : Patient presents for follow-up.  [de-identified] : Patient has been having epigastric discomfort and early satiety. Did had a few years ago and was resolved with Omeprazole. Denies any nausea, vomiting, change in bowel habits. Denies any black tarry stools. denies any cp, sob, palpitations

## 2024-02-05 NOTE — ADDENDUM
[FreeTextEntry1] : I, Francis Dubon, acted as a scribe on behalf of Dr. Clovis Tena MD, on 02/05/2024.   All medical entries made by the scribe were at my, Dr. Clovis Tena MD, direction and personally dictated by me on 02/05/2024. I have reviewed the chart and agree that the record accurately reflects my personal performance of the history, physical exam, assessment and plan. I have also personally directed, reviewed, and agreed with the chart.

## 2024-02-05 NOTE — ASSESSMENT
[FreeTextEntry1] : -BP is stable. Check lipid profile. -Advised to f/u with GI given discomfort. -To have abdominal US. Prescription provided. -Check H. Pylori, PPI given.

## 2024-02-07 RX ORDER — PANTOPRAZOLE 40 MG/1
40 TABLET, DELAYED RELEASE ORAL
Qty: 90 | Refills: 3 | Status: ACTIVE | COMMUNITY
Start: 2024-02-07 | End: 1900-01-01

## 2024-02-07 RX ORDER — ESOMEPRAZOLE MAGNESIUM 40 MG/1
40 CAPSULE, DELAYED RELEASE ORAL DAILY
Qty: 90 | Refills: 3 | Status: DISCONTINUED | COMMUNITY
Start: 2024-02-05 | End: 2024-02-07

## 2024-02-11 LAB
25(OH)D3 SERPL-MCNC: 24.7 NG/ML
ALBUMIN SERPL ELPH-MCNC: 4.4 G/DL
ALP BLD-CCNC: 71 U/L
ALT SERPL-CCNC: 21 U/L
ANION GAP SERPL CALC-SCNC: 11 MMOL/L
APPEARANCE: CLEAR
AST SERPL-CCNC: 23 U/L
BILIRUB SERPL-MCNC: 0.3 MG/DL
BILIRUBIN URINE: NEGATIVE
BLOOD URINE: ABNORMAL
BUN SERPL-MCNC: 13 MG/DL
CALCIUM SERPL-MCNC: 9.1 MG/DL
CHLORIDE SERPL-SCNC: 100 MMOL/L
CHOLEST SERPL-MCNC: 189 MG/DL
CO2 SERPL-SCNC: 26 MMOL/L
COLOR: NORMAL
CREAT SERPL-MCNC: 0.91 MG/DL
EGFR: 90 ML/MIN/1.73M2
ESTIMATED AVERAGE GLUCOSE: 114 MG/DL
GLUCOSE QUALITATIVE U: NEGATIVE
GLUCOSE SERPL-MCNC: 96 MG/DL
HBA1C MFR BLD HPLC: 5.6 %
HDLC SERPL-MCNC: 66 MG/DL
KETONES URINE: NEGATIVE
LDLC SERPL CALC-MCNC: 113 MG/DL
LEUKOCYTE ESTERASE URINE: NEGATIVE
NITRITE URINE: NEGATIVE
NONHDLC SERPL-MCNC: 123 MG/DL
PH URINE: 6
POTASSIUM SERPL-SCNC: 4 MMOL/L
PROT SERPL-MCNC: 6.6 G/DL
PROTEIN URINE: NEGATIVE
SODIUM SERPL-SCNC: 137 MMOL/L
SPECIFIC GRAVITY URINE: 1.02
TRIGL SERPL-MCNC: 55 MG/DL
TSH SERPL-ACNC: 2.88 UIU/ML
UREA BREATH TEST QL: NEGATIVE
UROBILINOGEN URINE: NORMAL
VIT B12 SERPL-MCNC: 456 PG/ML

## 2024-02-29 ENCOUNTER — RX RENEWAL (OUTPATIENT)
Age: 74
End: 2024-02-29

## 2024-03-18 ENCOUNTER — OUTPATIENT (OUTPATIENT)
Dept: OUTPATIENT SERVICES | Facility: HOSPITAL | Age: 74
LOS: 1 days | End: 2024-03-18
Payer: MEDICARE

## 2024-03-18 ENCOUNTER — APPOINTMENT (OUTPATIENT)
Dept: ULTRASOUND IMAGING | Facility: CLINIC | Age: 74
End: 2024-03-18
Payer: MEDICARE

## 2024-03-18 DIAGNOSIS — R10.13 EPIGASTRIC PAIN: ICD-10-CM

## 2024-03-18 DIAGNOSIS — E78.5 HYPERLIPIDEMIA, UNSPECIFIED: ICD-10-CM

## 2024-03-18 DIAGNOSIS — Z96.641 PRESENCE OF RIGHT ARTIFICIAL HIP JOINT: Chronic | ICD-10-CM

## 2024-03-18 PROCEDURE — 76700 US EXAM ABDOM COMPLETE: CPT | Mod: 26

## 2024-03-18 PROCEDURE — 76700 US EXAM ABDOM COMPLETE: CPT

## 2024-03-24 RX ORDER — BIFIDOBACTERIUM LONGUM 10MM CELL
4 CAPSULE ORAL
Qty: 90 | Refills: 3 | Status: ACTIVE | COMMUNITY
Start: 2024-03-24 | End: 1900-01-01

## 2024-03-24 RX ORDER — SIMETHICONE 500 MG
500 CAPSULE ORAL
Qty: 90 | Refills: 0 | Status: ACTIVE | COMMUNITY
Start: 2024-03-24 | End: 1900-01-01

## 2024-04-05 ENCOUNTER — RX RENEWAL (OUTPATIENT)
Age: 74
End: 2024-04-05

## 2024-04-07 ENCOUNTER — RX RENEWAL (OUTPATIENT)
Age: 74
End: 2024-04-07

## 2024-04-07 RX ORDER — BACLOFEN 10 MG/1
10 TABLET ORAL
Qty: 90 | Refills: 0 | Status: ACTIVE | COMMUNITY
Start: 2021-02-01 | End: 1900-01-01

## 2024-04-18 ENCOUNTER — RX RENEWAL (OUTPATIENT)
Age: 74
End: 2024-04-18

## 2024-04-18 RX ORDER — GABAPENTIN 300 MG/1
300 CAPSULE ORAL EVERY 8 HOURS
Qty: 270 | Refills: 0 | Status: ACTIVE | COMMUNITY
Start: 2021-02-01 | End: 1900-01-01

## 2024-05-15 ENCOUNTER — RX RENEWAL (OUTPATIENT)
Age: 74
End: 2024-05-15

## 2024-05-15 RX ORDER — TAMSULOSIN HYDROCHLORIDE 0.4 MG/1
0.4 CAPSULE ORAL
Qty: 90 | Refills: 0 | Status: ACTIVE | COMMUNITY
Start: 2022-02-11 | End: 1900-01-01

## 2024-07-02 ENCOUNTER — RX RENEWAL (OUTPATIENT)
Age: 74
End: 2024-07-02

## 2024-07-10 ENCOUNTER — RX RENEWAL (OUTPATIENT)
Age: 74
End: 2024-07-10

## 2024-10-01 ENCOUNTER — RX RENEWAL (OUTPATIENT)
Age: 74
End: 2024-10-01

## 2024-10-05 DIAGNOSIS — R29.898 OTHER SYMPTOMS AND SIGNS INVOLVING THE MUSCULOSKELETAL SYSTEM: ICD-10-CM

## 2024-12-27 ENCOUNTER — RX RENEWAL (OUTPATIENT)
Age: 74
End: 2024-12-27

## 2024-12-31 ENCOUNTER — RX RENEWAL (OUTPATIENT)
Age: 74
End: 2024-12-31

## 2025-01-22 ENCOUNTER — APPOINTMENT (OUTPATIENT)
Dept: INTERNAL MEDICINE | Facility: CLINIC | Age: 75
End: 2025-01-22

## 2025-03-26 ENCOUNTER — RX RENEWAL (OUTPATIENT)
Age: 75
End: 2025-03-26

## 2025-03-27 ENCOUNTER — RX RENEWAL (OUTPATIENT)
Age: 75
End: 2025-03-27

## 2025-04-29 ENCOUNTER — APPOINTMENT (OUTPATIENT)
Dept: INTERNAL MEDICINE | Facility: CLINIC | Age: 75
End: 2025-04-29
Payer: MEDICARE

## 2025-04-29 ENCOUNTER — NON-APPOINTMENT (OUTPATIENT)
Age: 75
End: 2025-04-29

## 2025-04-29 VITALS
HEART RATE: 70 BPM | DIASTOLIC BLOOD PRESSURE: 73 MMHG | HEIGHT: 65 IN | BODY MASS INDEX: 24.16 KG/M2 | OXYGEN SATURATION: 98 % | SYSTOLIC BLOOD PRESSURE: 120 MMHG | WEIGHT: 145 LBS | TEMPERATURE: 97.9 F

## 2025-04-29 DIAGNOSIS — Z00.00 ENCOUNTER FOR GENERAL ADULT MEDICAL EXAMINATION W/OUT ABNORMAL FINDINGS: ICD-10-CM

## 2025-04-29 DIAGNOSIS — N50.819 TESTICULAR PAIN, UNSPECIFIED: ICD-10-CM

## 2025-04-29 DIAGNOSIS — Z87.891 PERSONAL HISTORY OF NICOTINE DEPENDENCE: ICD-10-CM

## 2025-04-29 DIAGNOSIS — Z87.39 PERSONAL HISTORY OF OTHER DISEASES OF THE MUSCULOSKELETAL SYSTEM AND CONNECTIVE TISSUE: ICD-10-CM

## 2025-04-29 DIAGNOSIS — N40.0 BENIGN PROSTATIC HYPERPLASIA WITHOUT LOWER URINARY TRACT SYMPMS: ICD-10-CM

## 2025-04-29 DIAGNOSIS — R42 DIZZINESS AND GIDDINESS: ICD-10-CM

## 2025-04-29 PROCEDURE — G0439: CPT

## 2025-04-29 PROCEDURE — 36415 COLL VENOUS BLD VENIPUNCTURE: CPT

## 2025-05-07 ENCOUNTER — NON-APPOINTMENT (OUTPATIENT)
Age: 75
End: 2025-05-07

## 2025-05-07 LAB
25(OH)D3 SERPL-MCNC: 27.8 NG/ML
ALBUMIN SERPL ELPH-MCNC: 4.5 G/DL
ALP BLD-CCNC: 73 U/L
ALT SERPL-CCNC: 21 U/L
ANION GAP SERPL CALC-SCNC: 12 MMOL/L
AST SERPL-CCNC: 26 U/L
BILIRUB SERPL-MCNC: 0.3 MG/DL
BUN SERPL-MCNC: 14 MG/DL
CALCIUM SERPL-MCNC: 9.5 MG/DL
CHLORIDE SERPL-SCNC: 100 MMOL/L
CHOLEST SERPL-MCNC: 198 MG/DL
CO2 SERPL-SCNC: 26 MMOL/L
CREAT SERPL-MCNC: 0.93 MG/DL
EGFRCR SERPLBLD CKD-EPI 2021: 87 ML/MIN/1.73M2
ESTIMATED AVERAGE GLUCOSE: 114 MG/DL
GLUCOSE SERPL-MCNC: 105 MG/DL
HBA1C MFR BLD HPLC: 5.6 %
HCT VFR BLD CALC: 40.6 %
HDLC SERPL-MCNC: 66 MG/DL
HGB BLD-MCNC: 13.5 G/DL
LDLC SERPL-MCNC: 117 MG/DL
MCHC RBC-ENTMCNC: 32.1 PG
MCHC RBC-ENTMCNC: 33.3 G/DL
MCV RBC AUTO: 96.4 FL
NONHDLC SERPL-MCNC: 132 MG/DL
PLATELET # BLD AUTO: 172 K/UL
POTASSIUM SERPL-SCNC: 4.7 MMOL/L
PROT SERPL-MCNC: 6.7 G/DL
PSA SERPL-MCNC: 6.78 NG/ML
RBC # BLD: 4.21 M/UL
RBC # FLD: 14 %
SODIUM SERPL-SCNC: 138 MMOL/L
TRIGL SERPL-MCNC: 80 MG/DL
TSH SERPL-ACNC: 2.42 UIU/ML
VIT B12 SERPL-MCNC: 334 PG/ML
WBC # FLD AUTO: 6.03 K/UL

## 2025-07-18 ENCOUNTER — TRANSCRIPTION ENCOUNTER (OUTPATIENT)
Age: 75
End: 2025-07-18

## 2025-07-25 ENCOUNTER — RX RENEWAL (OUTPATIENT)
Age: 75
End: 2025-07-25